# Patient Record
Sex: FEMALE | Race: WHITE
[De-identification: names, ages, dates, MRNs, and addresses within clinical notes are randomized per-mention and may not be internally consistent; named-entity substitution may affect disease eponyms.]

---

## 2017-12-07 ENCOUNTER — HOSPITAL ENCOUNTER (EMERGENCY)
Dept: HOSPITAL 92 - ERS | Age: 62
Discharge: HOME | End: 2017-12-07
Payer: MEDICARE

## 2017-12-07 DIAGNOSIS — E78.2: ICD-10-CM

## 2017-12-07 DIAGNOSIS — E11.9: ICD-10-CM

## 2017-12-07 DIAGNOSIS — J44.0: ICD-10-CM

## 2017-12-07 DIAGNOSIS — Z79.01: ICD-10-CM

## 2017-12-07 DIAGNOSIS — I10: ICD-10-CM

## 2017-12-07 DIAGNOSIS — J20.9: ICD-10-CM

## 2017-12-07 DIAGNOSIS — Z79.899: ICD-10-CM

## 2017-12-07 DIAGNOSIS — Z86.73: ICD-10-CM

## 2017-12-07 DIAGNOSIS — D50.9: ICD-10-CM

## 2017-12-07 DIAGNOSIS — J44.1: Primary | ICD-10-CM

## 2017-12-07 DIAGNOSIS — Z79.84: ICD-10-CM

## 2017-12-07 LAB
ALP SERPL-CCNC: 121 U/L (ref 40–150)
ALT SERPL W P-5'-P-CCNC: 40 U/L (ref 8–55)
ANION GAP SERPL CALC-SCNC: 17 MMOL/L (ref 10–20)
APTT PPP: 59.3 SEC (ref 22.9–36.1)
AST SERPL-CCNC: 43 U/L (ref 5–34)
BASOPHILS # BLD AUTO: 0.1 THOU/UL (ref 0–0.2)
BASOPHILS NFR BLD AUTO: 0.5 % (ref 0–1)
BILIRUB SERPL-MCNC: 0.5 MG/DL (ref 0.2–1.2)
BUN SERPL-MCNC: 15 MG/DL (ref 9.8–20.1)
CALCIUM SERPL-MCNC: 9.3 MG/DL (ref 7.8–10.44)
CHLORIDE SERPL-SCNC: 100 MMOL/L (ref 98–107)
CK SERPL-CCNC: 53 U/L (ref 29–168)
CO2 SERPL-SCNC: 23 MMOL/L (ref 22–29)
CREAT CL PREDICTED SERPL C-G-VRATE: 0 ML/MIN (ref 70–130)
EOSINOPHIL # BLD AUTO: 0 THOU/UL (ref 0–0.7)
EOSINOPHIL NFR BLD AUTO: 0.3 % (ref 0–10)
GLOBULIN SER CALC-MCNC: 3.7 G/DL (ref 2.4–3.5)
HCT VFR BLD CALC: 41 % (ref 36–47)
HYALINE CASTS #/AREA URNS LPF: (no result) LPF
LYMPHOCYTES # BLD: 2.6 THOU/UL (ref 1.2–3.4)
LYMPHOCYTES NFR BLD AUTO: 18.7 % (ref 21–51)
MONOCYTES # BLD AUTO: 1.2 THOU/UL (ref 0.11–0.59)
MONOCYTES NFR BLD AUTO: 8.4 % (ref 0–10)
NEUTROPHILS # BLD AUTO: 9.9 THOU/UL (ref 1.4–6.5)
PROT UR STRIP.AUTO-MCNC: 300 MG/DL
PROTHROMBIN TIME: 27.5 SEC (ref 12–14.7)
RBC # BLD AUTO: 4.09 MILL/UL (ref 4.2–5.4)
TROPONIN I SERPL DL<=0.01 NG/ML-MCNC: (no result) NG/ML (ref ?–0.03)
WBC # BLD AUTO: 13.7 THOU/UL (ref 4.8–10.8)
WBC UR QL AUTO: (no result) HPF (ref 0–3)

## 2017-12-07 PROCEDURE — 82550 ASSAY OF CK (CPK): CPT

## 2017-12-07 PROCEDURE — 82553 CREATINE MB FRACTION: CPT

## 2017-12-07 PROCEDURE — 85610 PROTHROMBIN TIME: CPT

## 2017-12-07 PROCEDURE — 81015 MICROSCOPIC EXAM OF URINE: CPT

## 2017-12-07 PROCEDURE — 96374 THER/PROPH/DIAG INJ IV PUSH: CPT

## 2017-12-07 PROCEDURE — 83880 ASSAY OF NATRIURETIC PEPTIDE: CPT

## 2017-12-07 PROCEDURE — 80053 COMPREHEN METABOLIC PANEL: CPT

## 2017-12-07 PROCEDURE — 51701 INSERT BLADDER CATHETER: CPT

## 2017-12-07 PROCEDURE — 71010: CPT

## 2017-12-07 PROCEDURE — 84484 ASSAY OF TROPONIN QUANT: CPT

## 2017-12-07 PROCEDURE — 85379 FIBRIN DEGRADATION QUANT: CPT

## 2017-12-07 PROCEDURE — 85025 COMPLETE CBC W/AUTO DIFF WBC: CPT

## 2017-12-07 PROCEDURE — A4353 INTERMITTENT URINARY CATH: HCPCS

## 2017-12-07 PROCEDURE — 93005 ELECTROCARDIOGRAM TRACING: CPT

## 2017-12-07 PROCEDURE — 36415 COLL VENOUS BLD VENIPUNCTURE: CPT

## 2017-12-07 PROCEDURE — 85730 THROMBOPLASTIN TIME PARTIAL: CPT

## 2017-12-07 PROCEDURE — 70450 CT HEAD/BRAIN W/O DYE: CPT

## 2017-12-07 PROCEDURE — 81003 URINALYSIS AUTO W/O SCOPE: CPT

## 2017-12-07 NOTE — RAD
PORTABLE UPRIGHT FRONTAL CHEST RADIOGRAPH:

 

DATE: 12/7/17.

 

COMPARISON: 

3/2/15.

 

HISTORY: 

Headache and increasing shortness of breath.

 

FINDINGS: 

There is no pneumothorax or pleural fluid and no focal consolidation or alveolar edema.  Heart and me
diastinal contours appear within normal limits.

 

IMPRESSION: 

No acute findings.

 

POS: [facility]

## 2017-12-07 NOTE — CT
HEAD CT WITHOUT CONTRAST:

 

Date: 12-7-17 

 

Comparison: 3-2-15

 

History: Headache and shortness of breath. 

 

Technique: Serial axial CT imaging at 5 mm intervals from vertex through skull base without contrast.
 

 

FINDINGS: 

The imaged paranasal sinuses and mastoid air cells are well aerated. There is atherosclerotic calcifi
cation of the cavernous carotid arteries. There is no displaced calvarial fracture. 

 

There is extensive encephalomalacia involving the left cerebral hemisphere, evidence of prior extensi
ve left MCA infarction. Hypodensity noted within the posterior inferior left cerebellar hemisphere, c
onsistent with areas of prior ischemia/insult as well. No intracranial hemorrhage, midline shift, or 
mass effect. 

 

IMPRESSION: 

Stable head CT as detailed above. No intracranial hemorrhage or displaced calvarial fracture. 

 

POS: [facility]

## 2017-12-09 NOTE — EKG
Test Reason : 

Blood Pressure : ***/*** mmHG

Vent. Rate : 110 BPM     Atrial Rate : 110 BPM

   P-R Int : 176 ms          QRS Dur : 100 ms

    QT Int : 344 ms       P-R-T Axes : 066 067 034 degrees

   QTc Int : 465 ms

 

Sinus tachycardia with occasional Premature ventricular complexes

Otherwise normal ECG

 

Confirmed by JASMEET FRENCH D.O. ([id number]),  PETROS VELAZQUEZ ([id number]) on 12/9/2017 3:39:56 PM

 

Referred By:             Confirmed By:JASMEET FRENCH D.O.

## 2020-05-17 ENCOUNTER — HOSPITAL ENCOUNTER (INPATIENT)
Dept: HOSPITAL 92 - CCU | Age: 65
LOS: 10 days | Discharge: HOME | DRG: 871 | End: 2020-05-27
Attending: INTERNAL MEDICINE | Admitting: FAMILY MEDICINE
Payer: MEDICARE

## 2020-05-17 VITALS — BODY MASS INDEX: 38.9 KG/M2

## 2020-05-17 DIAGNOSIS — Z86.718: ICD-10-CM

## 2020-05-17 DIAGNOSIS — Z20.828: ICD-10-CM

## 2020-05-17 DIAGNOSIS — E66.01: ICD-10-CM

## 2020-05-17 DIAGNOSIS — Z79.01: ICD-10-CM

## 2020-05-17 DIAGNOSIS — F32.9: ICD-10-CM

## 2020-05-17 DIAGNOSIS — E78.5: ICD-10-CM

## 2020-05-17 DIAGNOSIS — I89.0: ICD-10-CM

## 2020-05-17 DIAGNOSIS — A41.9: Primary | ICD-10-CM

## 2020-05-17 DIAGNOSIS — J44.1: ICD-10-CM

## 2020-05-17 DIAGNOSIS — K92.2: ICD-10-CM

## 2020-05-17 DIAGNOSIS — J69.0: ICD-10-CM

## 2020-05-17 DIAGNOSIS — Z87.891: ICD-10-CM

## 2020-05-17 DIAGNOSIS — R13.10: ICD-10-CM

## 2020-05-17 DIAGNOSIS — J45.30: ICD-10-CM

## 2020-05-17 DIAGNOSIS — G81.94: ICD-10-CM

## 2020-05-17 DIAGNOSIS — I12.9: ICD-10-CM

## 2020-05-17 DIAGNOSIS — E11.22: ICD-10-CM

## 2020-05-17 DIAGNOSIS — J90: ICD-10-CM

## 2020-05-17 DIAGNOSIS — D62: ICD-10-CM

## 2020-05-17 DIAGNOSIS — Z86.711: ICD-10-CM

## 2020-05-17 DIAGNOSIS — K22.10: ICD-10-CM

## 2020-05-17 DIAGNOSIS — J96.21: ICD-10-CM

## 2020-05-17 DIAGNOSIS — F03.90: ICD-10-CM

## 2020-05-17 DIAGNOSIS — R53.81: ICD-10-CM

## 2020-05-17 DIAGNOSIS — I63.9: ICD-10-CM

## 2020-05-17 DIAGNOSIS — E87.0: ICD-10-CM

## 2020-05-17 DIAGNOSIS — N18.3: ICD-10-CM

## 2020-05-17 DIAGNOSIS — I69.328: ICD-10-CM

## 2020-05-17 DIAGNOSIS — J96.22: ICD-10-CM

## 2020-05-17 DIAGNOSIS — Z79.4: ICD-10-CM

## 2020-05-17 DIAGNOSIS — R65.20: ICD-10-CM

## 2020-05-17 DIAGNOSIS — I69.319: ICD-10-CM

## 2020-05-17 DIAGNOSIS — L03.115: ICD-10-CM

## 2020-05-17 LAB
ALBUMIN SERPL BCG-MCNC: 2.5 G/DL (ref 3.4–4.8)
ALP SERPL-CCNC: 121 U/L (ref 40–110)
ALT SERPL W P-5'-P-CCNC: 24 U/L (ref 8–55)
ANALYZER IN CARDIO: (no result)
ANALYZER IN CARDIO: (no result)
ANION GAP SERPL CALC-SCNC: 14 MMOL/L (ref 10–20)
AST SERPL-CCNC: 29 U/L (ref 5–34)
BASE EXCESS STD BLDA CALC-SCNC: 2.8 MEQ/L
BASE EXCESS STD BLDA CALC-SCNC: 3.4 MEQ/L
BASO STIPL BLD QL SMEAR: (no result) (100X)
BILIRUB SERPL-MCNC: 0.2 MG/DL (ref 0.2–1.2)
BUN SERPL-MCNC: 48 MG/DL (ref 9.8–20.1)
CA-I BLDA-SCNC: 1.28 MMOL/L (ref 1.12–1.3)
CA-I BLDA-SCNC: 1.29 MMOL/L (ref 1.12–1.3)
CALCIUM SERPL-MCNC: 9.2 MG/DL (ref 7.8–10.44)
CHLORIDE SERPL-SCNC: 117 MMOL/L (ref 98–107)
CO2 SERPL-SCNC: 23 MMOL/L (ref 23–31)
CREAT CL PREDICTED SERPL C-G-VRATE: 89 ML/MIN (ref 70–130)
GLOBULIN SER CALC-MCNC: 3.1 G/DL (ref 2.4–3.5)
GLUCOSE SERPL-MCNC: 359 MG/DL (ref 80–115)
HCO3 BLDA-SCNC: 29.9 MEQ/L (ref 22–28)
HCO3 BLDA-SCNC: 30.6 MEQ/L (ref 22–28)
HCT VFR BLDA CALC: 32 % (ref 36–47)
HCT VFR BLDA CALC: 35 % (ref 36–47)
HGB BLD-MCNC: 10.9 G/DL (ref 12–16)
HGB BLDA-MCNC: 11 G/DL (ref 12–16)
HGB BLDA-MCNC: 12 G/DL (ref 12–16)
MCH RBC QN AUTO: 32.6 PG (ref 27–31)
MCV RBC AUTO: 100 FL (ref 78–98)
MDIFF COMPLETE?: YES
O2 A-A PPRESDIFF RESPIRATORY: 592.12 MM[HG] (ref 0–20)
PCO2 BLDA: 54.7 MMHG (ref 35–45)
PCO2 BLDA: 63.4 MMHG (ref 35–45)
PH BLDA: 7.3 [PH] (ref 7.35–7.45)
PH BLDA: 7.36 [PH] (ref 7.35–7.45)
PLATELET # BLD AUTO: 226 THOU/UL (ref 130–400)
PO2 BLDA: 46 MMHG (ref 80–?)
PO2 BLDA: 52.5 MMHG (ref 80–?)
POLYCHROMASIA BLD QL SMEAR: (no result) (100X)
POTASSIUM BLD-SCNC: 3.54 MMOL/L (ref 3.7–5.3)
POTASSIUM BLD-SCNC: 3.71 MMOL/L (ref 3.7–5.3)
POTASSIUM SERPL-SCNC: 4.7 MMOL/L (ref 3.5–5.1)
RBC # BLD AUTO: 3.34 MILL/UL (ref 4.2–5.4)
SODIUM SERPL-SCNC: 149 MMOL/L (ref 136–145)
SPECIMEN DRAWN FROM PATIENT: (no result)
SPECIMEN DRAWN FROM PATIENT: (no result)
WBC # BLD AUTO: 15.7 THOU/UL (ref 4.8–10.8)

## 2020-05-17 PROCEDURE — 80202 ASSAY OF VANCOMYCIN: CPT

## 2020-05-17 PROCEDURE — 85025 COMPLETE CBC W/AUTO DIFF WBC: CPT

## 2020-05-17 PROCEDURE — 80048 BASIC METABOLIC PNL TOTAL CA: CPT

## 2020-05-17 PROCEDURE — 86901 BLOOD TYPING SEROLOGIC RH(D): CPT

## 2020-05-17 PROCEDURE — 94003 VENT MGMT INPAT SUBQ DAY: CPT

## 2020-05-17 PROCEDURE — 87070 CULTURE OTHR SPECIMN AEROBIC: CPT

## 2020-05-17 PROCEDURE — 74018 RADEX ABDOMEN 1 VIEW: CPT

## 2020-05-17 PROCEDURE — 84100 ASSAY OF PHOSPHORUS: CPT

## 2020-05-17 PROCEDURE — S0028 INJECTION, FAMOTIDINE, 20 MG: HCPCS

## 2020-05-17 PROCEDURE — 74230 X-RAY XM SWLNG FUNCJ C+: CPT

## 2020-05-17 PROCEDURE — 05H633Z INSERTION OF INFUSION DEVICE INTO LEFT SUBCLAVIAN VEIN, PERCUTANEOUS APPROACH: ICD-10-PCS

## 2020-05-17 PROCEDURE — 86900 BLOOD TYPING SEROLOGIC ABO: CPT

## 2020-05-17 PROCEDURE — 80053 COMPREHEN METABOLIC PANEL: CPT

## 2020-05-17 PROCEDURE — 36416 COLLJ CAPILLARY BLOOD SPEC: CPT

## 2020-05-17 PROCEDURE — 0W9B3ZZ DRAINAGE OF LEFT PLEURAL CAVITY, PERCUTANEOUS APPROACH: ICD-10-PCS

## 2020-05-17 PROCEDURE — 85007 BL SMEAR W/DIFF WBC COUNT: CPT

## 2020-05-17 PROCEDURE — 84443 ASSAY THYROID STIM HORMONE: CPT

## 2020-05-17 PROCEDURE — C9113 INJ PANTOPRAZOLE SODIUM, VIA: HCPCS

## 2020-05-17 PROCEDURE — 85027 COMPLETE CBC AUTOMATED: CPT

## 2020-05-17 PROCEDURE — 0BCB8ZZ EXTIRPATION OF MATTER FROM LEFT LOWER LOBE BRONCHUS, VIA NATURAL OR ARTIFICIAL OPENING ENDOSCOPIC: ICD-10-PCS

## 2020-05-17 PROCEDURE — 71250 CT THORAX DX C-: CPT

## 2020-05-17 PROCEDURE — 0DJ08ZZ INSPECTION OF UPPER INTESTINAL TRACT, VIA NATURAL OR ARTIFICIAL OPENING ENDOSCOPIC: ICD-10-PCS | Performed by: INTERNAL MEDICINE

## 2020-05-17 PROCEDURE — 5A1945Z RESPIRATORY VENTILATION, 24-96 CONSECUTIVE HOURS: ICD-10-PCS | Performed by: FAMILY MEDICINE

## 2020-05-17 PROCEDURE — 84145 PROCALCITONIN (PCT): CPT

## 2020-05-17 PROCEDURE — 86850 RBC ANTIBODY SCREEN: CPT

## 2020-05-17 PROCEDURE — 36415 COLL VENOUS BLD VENIPUNCTURE: CPT

## 2020-05-17 PROCEDURE — 87205 SMEAR GRAM STAIN: CPT

## 2020-05-17 PROCEDURE — 82805 BLOOD GASES W/O2 SATURATION: CPT

## 2020-05-17 PROCEDURE — 85610 PROTHROMBIN TIME: CPT

## 2020-05-17 PROCEDURE — 0BH17EZ INSERTION OF ENDOTRACHEAL AIRWAY INTO TRACHEA, VIA NATURAL OR ARTIFICIAL OPENING: ICD-10-PCS | Performed by: FAMILY MEDICINE

## 2020-05-17 PROCEDURE — 82274 ASSAY TEST FOR BLOOD FECAL: CPT

## 2020-05-17 PROCEDURE — 94640 AIRWAY INHALATION TREATMENT: CPT

## 2020-05-17 PROCEDURE — 71045 X-RAY EXAM CHEST 1 VIEW: CPT

## 2020-05-17 PROCEDURE — 93306 TTE W/DOPPLER COMPLETE: CPT

## 2020-05-17 PROCEDURE — 83880 ASSAY OF NATRIURETIC PEPTIDE: CPT

## 2020-05-17 PROCEDURE — 83735 ASSAY OF MAGNESIUM: CPT

## 2020-05-17 PROCEDURE — 94002 VENT MGMT INPAT INIT DAY: CPT

## 2020-05-17 RX ADMIN — VANCOMYCIN HYDROCHLORIDE SCH MLS: 1 INJECTION, SOLUTION INTRAVENOUS at 22:29

## 2020-05-17 RX ADMIN — INSULIN LISPRO PRN UNIT: 100 INJECTION, SOLUTION INTRAVENOUS; SUBCUTANEOUS at 18:36

## 2020-05-17 NOTE — PDOC.BPN
- Brief Progress Note





Patient's PCP found to be Dr. Lloyd. Contacted Dr. Hayden with hospitalist 

service. [facility] Hospitalist will assume care at 0700 on 5/18/20. Dr. Gt Daigle, FMR attending, aware and in agreement with plan.

## 2020-05-17 NOTE — PDOC.FPRHP
- History of Present Illness


Chief Complaint: Transfer from [facility] due to intubation


History of Present Illness: 





65 yo female admitted to CCU from [facility] due to intubated status. Patient was 

admitted on 5/13 due to right lower extremity cellulitis. She was placed on 

Vanc and Cefepime. Per documentation, cellulitis improved. However, today on 5/ 17 patient was found to be altered and had a CO2 of greater than 60 and not 

able to protect her airway. At that time, it was decided the patient was to be 

intubated and transferred to [facility] for CCU admission. No other history 

able to be obtained due to patient's mental status and no other physician 

contact.





- Allergies/Adverse Reactions


 Allergies











Allergy/AdvReac Type Severity Reaction Status Date / Time


 


codeine Allergy   Verified 05/14/20 17:31














- Home Medications


 











 Medication  Instructions  Recorded  Confirmed  Type


 


Amlodipine [Norvasc] 10 mg PO DAILY 03/02/15 03/02/15 History


 


Cholecalciferol (Vitamin D3) 2,000 unit PO DAILY 03/02/15 03/02/15 History





[Vitamin D3]    


 


Docusate [Colace] 100 mg PO BID 03/02/15 03/02/15 History


 


Donepezil HCl [Aricept] 10 mg PO HS 03/02/15 03/02/15 History


 


Ferrous Sulfate [Iron] 325 mg PO BID 03/02/15 03/02/15 History


 


Glimepiride [Amaryl] 2 mg PO QAM- 03/02/15 03/02/15 History


 


Insulin Glargine,Hum.Rec.Anlog 26 unit SC  03/02/15 03/02/15 History





[Lantus Solostar]    


 


Multivitamin [Multi-Vitamin Daily] 1 tablet PO DAILY 03/02/15 03/02/15 History


 


Pravastatin Sodium [Pravachol] 40 mg PO HS 03/02/15 03/02/15 History


 


Promethazine HCl [Phenergan] 25 mg IM Q6HR PRN 03/02/15 03/02/15 History


 


Warfarin Sodium [Coumadin] 6 mg PO DAILY 03/02/15 03/02/15 History


 


cloNIDine [Catapres] 0.1 mg PO Q6H PRN 03/02/15 03/02/15 History


 


cloNIDine [Catapres] 0.2 mg PO TID 03/02/15 03/02/15 History


 


metFORMIN HCl 500 mg PO BID-WM 03/02/15 03/02/15 History


 


traMADol HCl [Tramadol HCl] 50 mg PO BID 03/02/15 03/02/15 History


 


traZODone HCl 75 mg PO HS 03/02/15 03/02/15 History


 


Acetaminophen [Tylenol Regular 650 mg PO Q4H PRN #0 tab 03/03/15  Rx





Strength]    


 


Glucagon 1 mg IM PRN PRN #0 vial 03/03/15  Rx


 


HumaLOG [HumaLOG Vial] 0 units SC .MILD SLIDING SCALE PRN 03/03/15  Rx





 #0 vial   


 


Lisinopril [Zestril] 20 mg PO BID #0 tab 03/03/15  Rx














- History


PMHx: Asthma, Dementia, DM2, , HLD, Lymphedema, CVA


 


PSHx: Unable to be obtained





FHx: Unable to be obtained





Social: Lives in [place]. History of former smoking. No other history able to be 

obtained. 


 








- Review of Systems


ROS unobtainable: due to mental status





- Vital signs


BP: 155/77  HR: 102 RR: 24 Tmax: 98.0 Pox: 90% on Mech Vent  Wt: 113 kg   








- Physical Exam


Constitutional: NAD (Currently intubated and sedated)


-HEENT: 





ET in place


Heart: RRR, normal S1/S2


Lungs: no respiratory distress, good air movement


-Lungs: 





wheezing throughout


Abdomen: soft, non-tender, bowel sounds present, no masses/distention


Musculoskeletal: other (unable to be fully evaluated)


Neurological: other


-Neurological: 





intubated and sedated


Skin: other


-Skin: 





Right lower extremity swelling and redness. 


Heme/Lymphatic: no unusual bruising or bleeding, no purpura, no petechia, other


-Psychiatric: 





intubated and sedated





FMR H&P: Results





- Labs


Result Diagrams: 


 05/17/20 18:04





 05/17/20 18:04





FMR H&P: A/P





- Problem List


(1) Sepsis


Current Visit: Yes   Status: Acute   Code(s): A41.9 - SEPSIS, UNSPECIFIED 

ORGANISM   





(2) Cellulitis


Current Visit: Yes   Status: Acute   Code(s): L03.90 - CELLULITIS, UNSPECIFIED 

  





(3) History of Coumadin therapy


Current Visit: Yes   Status: Acute   Code(s): Z92.29 - PERSONAL HISTORY OF 

OTHER DRUG THERAPY   





(4) Acute respiratory failure with hypoxia


Current Visit: Yes   Status: Acute   Code(s): J96.01 - ACUTE RESPIRATORY 

FAILURE WITH HYPOXIA   





(5) Hypertension


Current Visit: Yes   Status: Acute   Code(s): I10 - ESSENTIAL (PRIMARY) 

HYPERTENSION   





(6) Diabetes mellitus


Current Visit: Yes   Status: Acute   Code(s): E11.9 - TYPE 2 DIABETES MELLITUS 

WITHOUT COMPLICATIONS   





(7) History of CVA (cerebrovascular accident)


Current Visit: Yes   Status: Acute   Code(s): Z86.73 - PRSNL HX OF TIA (TIA), 

AND CEREB INFRC W/O RESID DEFICITS   





- Plan





Sepsis 2/2 to Cellulitis


- Continued Vancomycin and Cefepime 5/13


- Cultures from outside ED no growth to date


- Consider repeat culture if no improvement occurs


- Supportive care





Acute respiratory failure with hypoxia 


- Currently on mechanical ventilation


- Will rule out DVT and consider PE evaluation as well however chronic Coumadin 

use and previous INR in records show 1.7


- Consulted Pulmonology, appreciate recs





History of Coumadin therapy  


- Unclear history


- INR at previous facility subtherapeutic


- Will restart 


- Consider therapeutic lovenox if needed





Hypertension 


- Monitor and restart medications as appropriate





DM2


- Accuchecks


- SSI 


- Restart home regimen as appropriate





Dementia


- Unknown baseline


- Will contact family for further direction





Hx of CVA


- Right sided deficits


- Occurred in 2012





PCP: DUSTIN Lloyd





Disposition: Guarded, will admit to CCU for further evaluation and management. 

Patient was seen and evaluated with Dr. Gt Daigle MD, FMR attending, who 

is in agreement with plan. 








Addendum - Attending





- Attending Attestation


Date/Time: 05/17/20 0714





I personally evaluated the patient and discussed the management with Dr. Jones.


I agree with the History, Examination, Assessment and Plan documented above 

with any addition or exceptions noted below.





Patient here after acute hypercapnic respiratory failure while admitted for 

sepsis 2/2 cellulitis. It is currently uncertain why she may have 

decompensated. She has no known history of CHF and was not receiving narcotics 

or other sedatives at her other hospital. She developed respiratory distress, 

was intubated, and transferred here for higher level of care. On exam, she is 

oxygenating well on ventilator, I believe 60% FiO2. She is morbidly obese, and 

there are markings consistent with tracking of RLE cellulitis. Patient will be 

admitted for hypercapnia, currently ventilated. Pulm on board. Check labs, CXR. 

Continue antibiotics for cellulitis, will obtain LE dopplers to R/O DVT. 

Further mgmt pending clinical course. We are otherwise unaware of her current 

metabolic and infectious status and will check labs and modify plan as needed.

## 2020-05-18 LAB
ALBUMIN SERPL BCG-MCNC: 2.4 G/DL (ref 3.4–4.8)
ALP SERPL-CCNC: 106 U/L (ref 40–110)
ALT SERPL W P-5'-P-CCNC: 21 U/L (ref 8–55)
ANALYZER IN CARDIO: (no result)
ANION GAP SERPL CALC-SCNC: 11 MMOL/L (ref 10–20)
AST SERPL-CCNC: 19 U/L (ref 5–34)
BASE EXCESS STD BLDA CALC-SCNC: 9.5 MEQ/L
BILIRUB SERPL-MCNC: 0.3 MG/DL (ref 0.2–1.2)
BUN SERPL-MCNC: 50 MG/DL (ref 9.8–20.1)
CA-I BLDA-SCNC: 1.21 MMOL/L (ref 1.12–1.3)
CALCIUM SERPL-MCNC: 8.7 MG/DL (ref 7.8–10.44)
CHLORIDE SERPL-SCNC: 113 MMOL/L (ref 98–107)
CO2 SERPL-SCNC: 32 MMOL/L (ref 23–31)
CREAT CL PREDICTED SERPL C-G-VRATE: 83 ML/MIN (ref 70–130)
GLOBULIN SER CALC-MCNC: 2.8 G/DL (ref 2.4–3.5)
GLUCOSE SERPL-MCNC: 279 MG/DL (ref 80–115)
HCO3 BLDA-SCNC: 34.2 MEQ/L (ref 22–28)
HCT VFR BLDA CALC: 31 % (ref 36–47)
HGB BLD-MCNC: 9.9 G/DL (ref 12–16)
HGB BLDA-MCNC: 10.5 G/DL (ref 12–16)
INR PPP: 2.6
INR PPP: 2.6
MAGNESIUM SERPL-MCNC: 2.2 MG/DL (ref 1.6–2.6)
MCH RBC QN AUTO: 32.3 PG (ref 27–31)
MCV RBC AUTO: 101 FL (ref 78–98)
MDIFF COMPLETE?: YES
PCO2 BLDA: 47.4 MMHG (ref 35–45)
PH BLDA: 7.48 [PH] (ref 7.35–7.45)
PLATELET # BLD AUTO: 300 THOU/UL (ref 130–400)
PO2 BLDA: 54.3 MMHG (ref 80–?)
POTASSIUM BLD-SCNC: 2.99 MMOL/L (ref 3.7–5.3)
POTASSIUM SERPL-SCNC: 3.1 MMOL/L (ref 3.5–5.1)
PROTHROMBIN TIME: 27.3 SEC (ref 12–14.7)
PROTHROMBIN TIME: 27.9 SEC (ref 12–14.7)
RBC # BLD AUTO: 3.07 MILL/UL (ref 4.2–5.4)
SODIUM SERPL-SCNC: 153 MMOL/L (ref 136–145)
SPECIMEN DRAWN FROM PATIENT: (no result)
WBC # BLD AUTO: 15.6 THOU/UL (ref 4.8–10.8)

## 2020-05-18 RX ADMIN — INSULIN LISPRO PRN UNIT: 100 INJECTION, SOLUTION INTRAVENOUS; SUBCUTANEOUS at 21:24

## 2020-05-18 RX ADMIN — FAMOTIDINE SCH MG: 10 INJECTION, SOLUTION INTRAVENOUS at 19:51

## 2020-05-18 RX ADMIN — VANCOMYCIN HYDROCHLORIDE SCH MLS: 1 INJECTION, SOLUTION INTRAVENOUS at 08:27

## 2020-05-18 RX ADMIN — INSULIN LISPRO PRN UNIT: 100 INJECTION, SOLUTION INTRAVENOUS; SUBCUTANEOUS at 23:57

## 2020-05-18 RX ADMIN — POTASSIUM CHLORIDE PRN MLS: 29.8 INJECTION, SOLUTION INTRAVENOUS at 07:38

## 2020-05-18 RX ADMIN — FENTANYL CITRATE SCH MLS: 50 INJECTION, SOLUTION INTRAMUSCULAR; INTRAVENOUS at 05:37

## 2020-05-18 RX ADMIN — FAMOTIDINE SCH MG: 10 INJECTION, SOLUTION INTRAVENOUS at 09:27

## 2020-05-18 RX ADMIN — INSULIN HUMAN SCH UNIT: 100 INJECTION, SUSPENSION SUBCUTANEOUS at 21:17

## 2020-05-18 RX ADMIN — INSULIN LISPRO PRN UNIT: 100 INJECTION, SOLUTION INTRAVENOUS; SUBCUTANEOUS at 06:00

## 2020-05-18 RX ADMIN — INSULIN LISPRO PRN UNIT: 100 INJECTION, SOLUTION INTRAVENOUS; SUBCUTANEOUS at 18:42

## 2020-05-18 RX ADMIN — INSULIN LISPRO PRN UNIT: 100 INJECTION, SOLUTION INTRAVENOUS; SUBCUTANEOUS at 11:17

## 2020-05-18 RX ADMIN — VANCOMYCIN HYDROCHLORIDE SCH MLS: 1 INJECTION, SOLUTION INTRAVENOUS at 19:52

## 2020-05-18 RX ADMIN — FENTANYL CITRATE SCH MLS: 50 INJECTION, SOLUTION INTRAMUSCULAR; INTRAVENOUS at 20:59

## 2020-05-18 RX ADMIN — INSULIN LISPRO PRN UNIT: 100 INJECTION, SOLUTION INTRAVENOUS; SUBCUTANEOUS at 02:26

## 2020-05-18 RX ADMIN — Medication SCH ML: at 19:52

## 2020-05-18 NOTE — CON
DATE OF CONSULTATION:  05/17/2020



CHIEF COMPLAINT:  Respiratory failure.



HISTORY OF PRESENT ILLNESS:  Ms. Núñez is a 64-year-old female who is a long-
term

resident of a local nursing home.  She had a stroke in 2008 with residual 
deficit

affecting both cognition, speech and ambulation.  Her family states that she 
has not

been in contact with them much in the time since COVID caused visitation

restrictions.  They were not aware of significant change in her status until 
several

days ago when the nursing home called to report that she had developed a 
cellulitis

of the right leg.  She was transferred to [facility] and

admitted with a diagnosis of cellulitis.  Her COVID test there was reported to 
be

negative.  She had progression of symptoms despite antibiotics and had 
worsening of

her respiratory distress.  She was given BiPAP support during the night and

high-flow nasal oxygen during the day.  This continued for several days without

significant recovery.  Today, however, the patient was noted to be more 
somnolent

and having increased respiratory distress.  A decision was made at that time to

intubate her and she was then transferred to [facility] for admission 
and

additional care.  I do not have access to their EMR



PAST MEDICAL HISTORY:  Remarkable for CVA in 2008 with residual deficits.  She 
has a

long smoking history, having quit at the time of her stroke.  She has a history 
of

chronic circulation issues of the right leg and has had previous bouts of

cellulitis.  She has a history of diabetes.  She has been on Coumadin

anticoagulation since the time of her stroke reportedly for stroke prophylaxis 
and

without known history of atrial fibrillation. 



REVIEW OF SYSTEMS:  Unobtainable from the patient.  Symptoms preceding her 
admission

are unclear and unavailable to me at this time.  Remote symptoms and findings 
have

been reviewed with her family by phone. 



FAMILY HISTORY:  Noncontributory.



PHYSICAL EXAMINATION:

GENERAL:  The patient is an obese female, appearing older than her stated age.  
She

is orally intubated. 

VITAL SIGNS:  Her blood pressure is 138/66, heart rate 92, saturation 91%, and

respiratory rate 29.  She is receiving ventilatory care. 

HEENT:  No adenopathy.  She has no JVD.  Cranial nerves cannot be tested. 

LUNGS:  Rhonchi with some decreased breath sounds in the left chest. 

HEART:  Regular rate and rhythm.  She has a systolic murmur along the left heart

border. 

ABDOMEN:  Soft.  There is no organomegaly.  She is obese. 

EXTREMITIES:  Demonstrate cellulitis of the right leg up to the mid thigh.  
This is

marked and has not exceeded the lyle which has previously been placed.  It is

erythematous, but not exceedingly warm.  She has 1+ edema on the left. 

NEUROLOGIC:  She is intubated and sedated and does not respond to verbal 
stimuli at

this time. 



LABORATORY DATA:  White count 15,700, hemoglobin is 10.9.  Differential 
includes 72

neutrophils and 7 bands.  Blood gas includes pH of 7.36, CO2 of 54, PO2 of 52, 
and

bicarbonate of 30.  This was obtained with a rate of 14 on 100% tidal volume of 
500.

 Electrolytes include sodium 149, potassium 4.7, chloride 117, anion gap is 14, 
CO2

is 23, BUN 48, creatinine 1.1, and glucose is 359.  Albumin has reduced to 2.5.

Liver tests are normal. 



Chest x-ray shows consolidation and effusion in the left base, which was not 
seen on

the x-ray of 3 days previously. 



IMPRESSION:  

1. Respiratory failure.  The patient has a history of chronic obstructive 
pulmonary

disease, but has not been in this degree of air hunger until now.  She was 
receiving

BiPAP during the night and high-flow nasal oxygen during the day while at 
[facility] prior to intubation and transfer.  I suspect that her

worsening may in fact be related to the development of a pneumonia, either

hematogenously spread or related to aspiration. 

2. Cellulitis, on empiric broad-spectrum antibiotics. 

3. History of previous stroke with residual deficit. 

4. Diabetes. 

5. Past tobacco abuse.



PLAN:  The patient is admitted to the intensive care unit and is receiving

ventilatory support.  She has a markedly elevated alveolar to arterial oxygen

gradient.  She has been on anticoagulation therapy and a DVT/PE is felt to be 
less

likely.  She does have consolidation in the left base and what appears to be an

effusion.  I have obtained consent from the patient's family to do a sonogram 
and

thoracentesis if indicated.  We will otherwise continue supportive therapy. 



The family wishes her to be full code status at least for the foreseeable 
future. 



Thank you for this consultation.  Pulmonary Service will continue to follow and

offer additional assistance. 







Job ID:  [id number]



MTDD

## 2020-05-18 NOTE — PRG
DATE OF SERVICE:  05/17/2020



I was called by the nurses following a bronchoscopy that the patient's peak airway

pressures have increased from around upper 20s to as high as 50.  Stat chest x-ray

was requested.  I returned to the hospital and reviewed the x-ray.  There was now

good aeration and expansion of the left lung with apparent increased consolidative

markings in the right lung base.  There is no evidence of pneumothorax as I thought

might be present having attempted a left basilar thoracentesis without success.  We

will continue to monitor.  Her airflow dynamics have been altered, and now PEEP

pressures are running about 28 to 34.  She is still on 100% oxygen and saturations

are approximately 92.  We will continue to observe.  Hopefully, the right increased

markings are related to bronchoscopy and washings. 



Duplex sonogram has also just been completed of the right leg.  While not ideal,

there is evidence of augmentation at multiple levels without obvious DVT. 







Job ID:  [id number]

## 2020-05-18 NOTE — RAD
Chest one view



HISTORY: Dyspnea. Intubated. Follow-up.



COMPARISON: 5/17/2020.



FINDINGS: Cardiac silhouette is magnified by projection and partially obscured by bilateral pleural f
luid and patchy bibasilar infiltrate.



Mediastinum is midline with aortic calcification. Lines and tubes appear unchanged in position.



Pulmonary vasculature is engorged.



Calcified granulomata are consistent with healed granulomatous disease.



No evidence of pneumothorax.



  



IMPRESSION :

Pulmonary vascular congestion, pleural fluid, and other findings are stable.



Reported By: MAVIS Arshad 

Electronically Signed:  5/18/2020 7:58 AM

## 2020-05-18 NOTE — PRG
DATE OF SERVICE:  05/17/2020



X-ray obtained following central line placement and attempted thoracentesis show

complete opacification of the left hemithorax.  There is volume loss on the left

with associated hyperinflation.  We are noting increasing difficulty maintaining

oxygen saturation despite ventilation with 100%.  On exam, the patient has absent

breath sounds and no chest excursion on the left with ventilation. 



It is my expectation that the rapid changes suggest a need for bronchoscopy to clear

probable mucus impaction.  I have called the patient's daughter and reviewed with

her the radiographic findings as well as treatments provided to this point.  She

agrees.  She also understands that there may well need to be repeat bronchoscopy

until she is totally clear. 







Job ID:  [id number]

## 2020-05-18 NOTE — OP
DATE OF PROCEDURE:  05/17/2020



PROCEDURES PERFORMED:  Thoracentesis and left subclavian central line.



DESCRIPTION OF PROCEDURE:  Consent was obtained from the patient's family.  The

thoracentesis was attempted first.  Due to the size and intubated status, the

procedure was done with the patient in a supine position via the posterior axillary

approach on the left.  Sonogram showed fluid.  Local anesthesia was then obtained

and thoracentesis catheter inserted.  Unfortunately, fluid was not obtained on two

passes and the procedure was subsequently terminated. 



The left upper chest was then prepped and draped in the usual fashion in

anticipation of central line placement.  Topical lidocaine anesthesia was used

following skin prep and drape.  The subclavian vein was cannulated, wire inserted,

and following dilator, the catheter was inserted without complication.  All ports

were aspirated and good blood return and flow was noted.  The catheter was secured

in place.  A dressing was applied.  X-ray is obtained and is pending at this time. 







Job ID:  [id number]

## 2020-05-18 NOTE — RAD
SINGLE VIEW CHEST:

 

Date:  05/17/2020

Time:  2105 hours

 

HISTORY:  

Central line placement. 

 

FINDINGS:

Single view of the chest shows a normal sized cardiomediastinal silhouette. The lines and tubes are u
nchanged in position. There is significant improvement in aeration of the left lung compared to the p
rior radiograph. There appears to be a moderate right pleural effusion. A small left pleural effusion
 is seen. 

 

IMPRESSION: 

Bilateral pleural effusions with significant improved aeration of the left lung. 

 

 

POS: [facility]

## 2020-05-18 NOTE — OP
DATE OF PROCEDURE:  05/17/2020



PREOPERATIVE DIAGNOSIS:  Opacification of the left hemithorax consistent with mucus

plugging. 



POSTOPERATIVE DIAGNOSIS:  Opacification of the left hemithorax consistent with mucus

plugging. 



DESCRIPTION OF PROCEDURE:  Informed consent was obtained from the patient's

daughter.  Procedure performed in the ICU under continuous hemodynamic monitoring.

Sedation was provided by propofol, which was already running for the patient

ventilator sedation and no dose adjustment was made during the procedure. 



A bronchoscope was inserted through the existing endotracheal tube.  Tip of the tube

is in good position proximal to the hali.  The main hali is unremarkable.  Right

upper lung, right middle lung, and right lower lung showed no obstruction, but are

diffusely edematous and erythematous.  Very tenacious mucus plugs are present in the

left bronchus.  These were copiously irrigated with Mucomyst, and were so thick that

they were removed in block through the tube externally.  The bronchoscope was then

reinserted and additional Mucomyst lavage was performed.  There was no evidence of

endobronchial obstruction.  Specimens are obtained for culture.  The patient

tolerated the procedure well with good blood pressure and pulse oximetry recording

throughout.  At the termination of the procedure, she is returned to previous

ventilator settings.  We plan repeat x-ray tomorrow and additional bronchoscopy may

be required. 







Job ID:  [id number]

## 2020-05-18 NOTE — RAD
SINGLE VIEW OF THE CHEST:

 

COMPARISON: 

5/17/2020.

 

HISTORY: 

Central line placement.

 

FINDINGS: 

A single view of the chest shows complete opacity of the left thorax.  The endotracheal tube and NG t
ube are unchanged in position.  There is a left subclavian central venous catheter with its tip in th
e superior vena cava.  No pneumothorax is appreciated.  There appears to be a small right pleural eff
usion.

 

IMPRESSION: 

1.  Status post central line placement without evidence of complication.

 

2.  Complete opacity in the left thorax.

 

POS: [facility]

## 2020-05-18 NOTE — PDOC.BPN
- Brief Progress Note





Talked with Dr. Bar this morning to confirm transfer of care. Gave him 

overnight updates. He was aware of tfx. Answered all questioned.

## 2020-05-18 NOTE — PRG
DATE OF SERVICE:  05/18/2020



SUBJECTIVE:  Jocelin Núñez is a 64-year-old female, who is intubated for progressive

respiratory failure.  She now is intubated in the vent, is sedated. 



She had difficulty maintaining saturation. 



She was in a nursing home, has had residual left-sided CVA from a previous stroke. 



She has had cellulitis at the [facility] and was transferred over here.

COVID test was negative. 



She was placed on BiPAP without much improvement.  She became progressively worse.

She is intubated. 



Her daughter is at the bedside.  She tells me that she sees a local doctor by the

name of Dr. Lloyd and has no other doctors on the list. 



Daughter states she is a full code, former smoker, severely deconditioned.



PHYSICAL EXAMINATION:

GENERAL:  Sedated, vented. 

VITAL SIGNS:  Pulse 94, blood pressure 187/87, saturations are 92 to 96,

respirations 20. 

CHEST:  Decreased breath sounds.  No wheezing. 

CARDIAC:  Normal S1 and S2.  No gallops. 

ABDOMEN:  No mass.



LABORATORY DATA:  White count 15,000, hemoglobin and hematocrit are 9 and 31,

platelet count 300.  PO2 is 54, pCO2 __________ rate of 14, on 70%, pressure support

10, PEEP of 8.  Sodium 153, BUN and creatinine are 50 and 1.8, glucose 254. 



ASSESSMENT:  Acute on chronic respiratory failure, bilateral pleural effusion,

severe hypoxemia, status post atelectasis on bronchoscopy.  Status post

thoracentesis. 



PLAN:  I have added steroids.  Continue aggressive neb treatments.  Continue

broad-spectrum antibiotics.  She is not weanable at this stage.  It is probably

__________ from what I can understand. 



One-half hour of critical time.







Job ID:  [id number]

## 2020-05-18 NOTE — ULT
RIGHT LOWER EXTREMITY VENOUS ULTRASOUND:

 

Date:  05/17/2020

 

COMPARISON:  None. 

 

HISTORY:  

Right lower extremity pain and swelling. 

 

TECHNIQUE:  

Multiplanar Gray scale and color Doppler images were obtained in a right lower extremity venous ultra
sound. Spectral analysis of the Doppler waveforms were performed. 

 

FINDINGS:

The right common femoral vein, profunda femoral vein, superficial femoral vein, and popliteal vein ar
e normal in appearance without visible thrombus. These vessels demonstrate normal compression, flow, 
and augmentation. The posterior tibial vein and greater saphenous vein are also patent.  

 

IMPRESSION: 

No evidence of deep venous thrombosis. 

 

POS: [facility]

## 2020-05-19 LAB
ANALYZER IN CARDIO: (no result)
ANION GAP SERPL CALC-SCNC: 14 MMOL/L (ref 10–20)
BASE EXCESS STD BLDA CALC-SCNC: 7.5 MEQ/L
BUN SERPL-MCNC: 46 MG/DL (ref 9.8–20.1)
CA-I BLDA-SCNC: 1.14 MMOL/L (ref 1.12–1.3)
CALCIUM SERPL-MCNC: 8.5 MG/DL (ref 7.8–10.44)
CHLORIDE SERPL-SCNC: 110 MMOL/L (ref 98–107)
CO2 SERPL-SCNC: 32 MMOL/L (ref 23–31)
CREAT CL PREDICTED SERPL C-G-VRATE: 83 ML/MIN (ref 70–130)
GLUCOSE SERPL-MCNC: 269 MG/DL (ref 80–115)
HCO3 BLDA-SCNC: 34.1 MEQ/L (ref 22–28)
HCT VFR BLDA CALC: 31 % (ref 36–47)
HGB BLD-MCNC: 10 G/DL (ref 12–16)
HGB BLDA-MCNC: 10.4 G/DL (ref 12–16)
INR PPP: 1.7
MCH RBC QN AUTO: 31.9 PG (ref 27–31)
MCV RBC AUTO: 101 FL (ref 78–98)
MDIFF COMPLETE?: YES
PCO2 BLDA: 59.8 MMHG (ref 35–45)
PH BLDA: 7.37 [PH] (ref 7.35–7.45)
PLATELET # BLD AUTO: 319 THOU/UL (ref 130–400)
PO2 BLDA: 60.3 MMHG (ref 80–?)
POTASSIUM BLD-SCNC: 4.22 MMOL/L (ref 3.7–5.3)
POTASSIUM SERPL-SCNC: 4 MMOL/L (ref 3.5–5.1)
PROTHROMBIN TIME: 20.3 SEC (ref 12–14.7)
RBC # BLD AUTO: 3.12 MILL/UL (ref 4.2–5.4)
SODIUM SERPL-SCNC: 152 MMOL/L (ref 136–145)
SPECIMEN DRAWN FROM PATIENT: (no result)
VANCOMYCIN TROUGH SERPL-MCNC: 38.5 UG/ML
WBC # BLD AUTO: 21.3 THOU/UL (ref 4.8–10.8)

## 2020-05-19 RX ADMIN — Medication SCH ML: at 09:30

## 2020-05-19 RX ADMIN — FAMOTIDINE SCH MG: 10 INJECTION, SOLUTION INTRAVENOUS at 21:07

## 2020-05-19 RX ADMIN — FENTANYL CITRATE SCH MLS: 50 INJECTION, SOLUTION INTRAMUSCULAR; INTRAVENOUS at 13:35

## 2020-05-19 RX ADMIN — VANCOMYCIN HYDROCHLORIDE SCH: 1 INJECTION, SOLUTION INTRAVENOUS at 09:07

## 2020-05-19 RX ADMIN — Medication SCH ML: at 21:06

## 2020-05-19 RX ADMIN — INSULIN LISPRO PRN UNIT: 100 INJECTION, SOLUTION INTRAVENOUS; SUBCUTANEOUS at 23:41

## 2020-05-19 RX ADMIN — INSULIN HUMAN SCH UNIT: 100 INJECTION, SUSPENSION SUBCUTANEOUS at 10:38

## 2020-05-19 RX ADMIN — FAMOTIDINE SCH MG: 10 INJECTION, SOLUTION INTRAVENOUS at 09:29

## 2020-05-19 RX ADMIN — INSULIN LISPRO PRN UNIT: 100 INJECTION, SOLUTION INTRAVENOUS; SUBCUTANEOUS at 14:02

## 2020-05-19 RX ADMIN — INSULIN LISPRO PRN UNIT: 100 INJECTION, SOLUTION INTRAVENOUS; SUBCUTANEOUS at 03:55

## 2020-05-19 RX ADMIN — INSULIN LISPRO PRN UNIT: 100 INJECTION, SOLUTION INTRAVENOUS; SUBCUTANEOUS at 16:16

## 2020-05-19 NOTE — RAD
Chest one view



HISTORY: Dyspnea. Intubated. Follow-up.



COMPARISON: 5/18/2020.



FINDINGS: Cardiac silhouette is magnified by projection and remains partially obscured by bilateral p
leural fluid and bibasilar parenchymal opacity.



Mediastinum is midline. Lines and tubes are unchanged in position.



Pulmonary vasculature is engorged. No evidence of pneumothorax.



Cardiac monitor leads overlie the chest.



  



IMPRESSION :

Pulmonary vascular congestion, pleural fluid, and other findings are stable.



Reported By: MAVIS Arshad 

Electronically Signed:  5/19/2020 7:56 AM

## 2020-05-19 NOTE — PDOC.HOSPP
- Subjective


Encounter Date: 05/19/20


Encounter Time: 15:00


non-verbal


Subjective: 





Patient seen and examined for resp failure. On Vent. Sedated. No overnight 

events





- Objective


Vital Signs & Weight: 


 Vital Signs (12 hours)











  Temp Pulse Resp BP Pulse Ox


 


 05/19/20 16:00  99.6 F   19  


 


 05/19/20 15:03     191/92 H 


 


 05/19/20 14:47   105 H   191/92 H 


 


 05/19/20 14:46   105 H  17   97


 


 05/19/20 14:00    14  


 


 05/19/20 12:00  99.5 F   14  


 


 05/19/20 10:26   94   183/91 H 


 


 05/19/20 10:25   95  16   98


 


 05/19/20 10:00    16  


 


 05/19/20 09:29   116 H   176/76 H 


 


 05/19/20 08:00  99.9 F H   16   98


 


 05/19/20 06:51   105 H   140/62 


 


 05/19/20 06:50   106 H  14   98


 


 05/19/20 06:00    14  








 Weight











Admit Weight                   243 lb


 


Weight                         245 lb 13.047 oz











 Most Recent Monitor Data











Heart Rate from ECG            115


 


NIBP                           168/113


 


NIBP BP-Mean                   131


 


Respiration from ECG           22


 


SpO2                           99














I&O: 


 











 05/18/20 05/19/20 05/20/20





 06:59 06:59 06:59


 


Intake Total 601 3168 229


 


Output Total 3285 2130 1330


 


Balance -2684 1038 -1101











Result Diagrams: 


 05/19/20 02:40





 05/19/20 02:40


Additional Labs: 


 Accuchecks











  05/19/20 05/19/20 05/19/20





  16:12 12:14 03:57


 


POC Glucose  375 H  361 H  258 H














  05/19/20 05/18/20 05/18/20





  00:01 21:20 18:04


 


POC Glucose  300 H  354 H  309 H











Radiology Reviewed by me: Yes (CXR - reviewed)


EKG Reviewed by me: Yes (Tele SR)





Hospitalist ROS





- Review of Systems


ROS unobtainable: due to mental status





- Medication


Medications: 


Active Medications











Generic Name Dose Route Start Last Admin





  Trade Name Freq  PRN Reason Stop Dose Admin


 


Albuterol/Ipratropium  3 ml  05/18/20 10:30  05/19/20 14:46





  Duoneb  NEB   3 ml





  [id number] ANNIE   Administration





     





     





     





     


 


Amlodipine Besylate  10 mg  05/19/20 09:00  05/19/20 09:29





  Norvasc  PO   10 mg





  DAILY ANNIE   Administration





     





     





     





     


 


Clonidine  0.2 mg  05/19/20 09:00  05/19/20 15:03





  Catapres  PO   0.2 mg





  TID ANNIE   Administration





     





     





     





     


 


Docusate Sodium  100 mg  05/19/20 09:00  05/19/20 09:29





  Colace  PO   100 mg





  DAILY ANNIE   Administration





     





     





     





     


 


Enoxaparin Sodium  40 mg  05/18/20 09:00  05/18/20 08:26





  Lovenox  SC   40 mg





  0900 ANNIE   Administration





     





     





     





     


 


Enoxaparin Sodium  30 mg  05/19/20 09:00  05/19/20 09:28





  Lovenox  SC   30 mg





  0900,2100 ANNIE   Administration





     





     





     





     


 


Famotidine  20 mg  05/18/20 09:00  05/19/20 09:29





  Pepcid  SLOW IVP   20 mg





  BID ANNIE   Administration





     





     





     





     


 


Cefepime HCl 2 gm/ Sodium  100 mls @ 200 mls/hr  05/17/20 17:00  05/19/20 16:18





  Chloride  IVPB   100 mls





  0500,1700 ANNIE   Administration





     





     





     





     


 


Fentanyl Citrate 2,000 mcg/  100 mls @ 0 mls/hr  05/17/20 16:59  05/19/20 13:35





  Sodium Chloride  IV  06/16/20 16:59  100 mls





  INF ANNIE   Administration





     





     





  Protocol   





  Per Protocol   


 


Potassium Chloride 40 meq/  100 mls @ 50 mls/hr  05/17/20 17:05  05/18/20 07:38





  Device  IVPB   100 mls





  ASDIR PRN   Administration





  FOR SERUM K+ 2.5 - 3.5   





     





     





     


 


Sodium Chloride  1,000 mls @ 100 mls/hr  05/18/20 09:15  05/19/20 16:18





  1/2 Normal Saline  IV   1,000 mls





  .Q10H ANNIE   Administration





     





     





     





     


 


Dexmedetomidine HCl 400 mcg/  100 mls @ 0 mls/hr  05/19/20 09:00  05/19/20 15:25





  Sodium Chloride  IVPB   100 mls





  INF ANNIE   Administration





     





     





  Protocol   





  Per Protocol   


 


Insulin Human Lispro  0 units  05/19/20 01:04  05/19/20 16:16





  Humalog  SC   13 unit





  .AGGRESSIVE SLIDING PRN   Administration





  AGGRESSIVE SLIDING SCALE   





     





  Protocol   





     


 


Lisinopril  40 mg  05/19/20 09:00  05/19/20 09:29





  Zestril  PO   40 mg





  DAILY ANNIE   Administration





     





     





     





     


 


Lorazepam  2 mg  05/17/20 16:59  05/17/20 22:47





  Ativan  SLOW IVP  06/16/20 16:59  2 mg





  Q1H PRN   Administration





  Breakthrough agitation   





     





     





     


 


Methylprednisolone Sodium Succinate  40 mg  05/19/20 09:00  05/19/20 09:30





  Solu-Medrol  IVP   40 mg





  BID ANNIE   Administration





     





     





     





     


 


Propofol  1,000 mg  05/17/20 16:59  05/19/20 04:15





  Diprivan  IV  06/16/20 16:59  1,000 mg





  INF PRN   Administration





  TO ACHIEVE GOAL RASS   





     





  Protocol   





     


 


Sodium Chloride  10 ml  05/18/20 21:00  05/19/20 09:30





  Flush - Normal Saline  IVF   10 ml





  Q12HR ANNIE   Administration





     





     





     





     














- Exam


General Appearance: NAD


General - other findings: on Vent


Heart: RRR, no gallops, no rubs, normal peripheral pulses


Respiratory: no wheezes, no rales, normal chest expansion, rhonchi


Gastrointestinal: soft, non-tender, non-distended, normal bowel sounds


Extremities: no cyanosis, no clubbing


Extremities - other findings: RLE edema. Erythema improving


Neurological - other findings: Neuro/Psych - cannot assess due to sedation





Hosp A/P





- Plan


DVT proph w/lovenox





Severe Sepsis 2/2 to RLE Cellulitis


Acute respiratory failure with hypoxia/hypercapnia


Hypertension 


DM2


Hypernatremia


Dementia


h/o CVA


Mild persistent Asthma


Depression


Chronic Anticoagulation


Morbid Obesity BMI 40.9


CKD 3





PLAN:


Cont Vancomycin/Cefepime


Monitor Vancomycin level


On 1/2 NS - reduce rate to 75 due to uncontrolled HTN


Increase NPH to 25 units BID


On IV Solumedrol 40 BID


Increase Clonidine


Add Hydralazine


Cont Aggressive sliding scale


Cont supportive care


Cont other meds as above


Daily CXR/ABG/labs

## 2020-05-19 NOTE — PDOC.HOSPP
- Subjective


Encounter Date: 05/18/20


Encounter Time: 14:00


non-verbal


Subjective: 





Patient seen and examined for Resp failure/Sepsis. On Vent. No overnight events





- Objective


Vital Signs & Weight: 


 Vital Signs (12 hours)











  Temp Pulse Resp BP Pulse Ox


 


 05/19/20 08:00  99.9 F H    


 


 05/19/20 06:51   105 H   140/62 


 


 05/19/20 06:50   106 H  14   98


 


 05/19/20 06:00    14  


 


 05/19/20 04:00  99.8 F H   14  


 


 05/19/20 03:57   96   130/58 L 


 


 05/19/20 02:00    14  


 


 05/19/20 00:00  99.4 F   14  


 


 05/18/20 22:26   129 H   156/67 H 


 


 05/18/20 22:00    17  








 Weight











Admit Weight                   243 lb


 


Weight                         245 lb 13.047 oz











 Most Recent Monitor Data











Heart Rate from ECG            119


 


NIBP                           187/91


 


NIBP BP-Mean                   123


 


Respiration from ECG           17


 


SpO2                           97














I&O: 


 











 05/18/20 05/19/20 05/20/20





 06:59 06:59 06:59


 


Intake Total 601 3168 


 


Output Total 3285 2130 125


 


Balance -2684 1038 -125











Result Diagrams: 


 05/19/20 02:40





 05/19/20 02:40


Additional Labs: 


 Accuchecks











  05/19/20 05/19/20 05/18/20





  03:57 00:01 21:20


 


POC Glucose  258 H  300 H  354 H














  05/18/20 05/18/20





  18:04 11:20


 


POC Glucose  309 H  238 H











Radiology Reviewed by me: Yes (CXR - reviewed)


EKG Reviewed by me: Yes (Tele SR)





Hospitalist ROS





- Review of Systems


ROS unobtainable: due to mental status





- Medication


Medications: 


Active Medications











Generic Name Dose Route Start Last Admin





  Trade Name Freq  PRN Reason Stop Dose Admin


 


Albuterol/Ipratropium  3 ml  05/18/20 10:30  05/19/20 06:50





  Duoneb  NEB   3 ml





  [id number] ANNIE   Administration





     





     





     





     


 


Enoxaparin Sodium  40 mg  05/18/20 09:00  05/18/20 08:26





  Lovenox  SC   40 mg





  0900 ANNIE   Administration





     





     





     





     


 


Famotidine  20 mg  05/18/20 09:00  05/18/20 19:51





  Pepcid  SLOW IVP   20 mg





  BID ANNIE   Administration





     





     





     





     


 


Cefepime HCl 2 gm/ Sodium  100 mls @ 200 mls/hr  05/17/20 17:00  05/19/20 04:15





  Chloride  IVPB   100 mls





  0500,1700 ANNIE   Administration





     





     





     





     


 


Vancomycin HCl 1 gm/ Device  200 mls @ 200 mls/hr  05/17/20 21:00  05/18/20 19:

52





  IVPB   200 mls





  Q12HR ANNIE   Administration





     





     





     





     


 


Fentanyl Citrate 2,000 mcg/  100 mls @ 0 mls/hr  05/17/20 16:59  05/18/20 20:59





  Sodium Chloride  IV  06/16/20 16:59  100 mls





  INF ANNIE   Administration





     





     





  Protocol   





  Per Protocol   


 


Potassium Chloride 40 meq/  100 mls @ 50 mls/hr  05/17/20 17:05  05/18/20 07:38





  Device  IVPB   100 mls





  ASDIR PRN   Administration





  FOR SERUM K+ 2.5 - 3.5   





     





     





     


 


Sodium Chloride  1,000 mls @ 100 mls/hr  05/18/20 09:15  05/19/20 05:37





  1/2 Normal Saline  IV   1,000 mls





  .Q10H ANNIE   Administration





     





     





     





     


 


Insulin Human Lispro  0 units  05/19/20 01:04  05/19/20 03:55





  Humalog  SC   9 unit





  .AGGRESSIVE SLIDING PRN   Administration





  AGGRESSIVE SLIDING SCALE   





     





  Protocol   





     


 


Insulin Human NPH  15 unit  05/18/20 21:00  05/18/20 21:17





  Humulin N  SC   15 unit





  BID ANNIE   Administration





     





     





     





     


 


Lorazepam  2 mg  05/17/20 16:59  05/17/20 22:47





  Ativan  SLOW IVP  06/16/20 16:59  2 mg





  Q1H PRN   Administration





  Breakthrough agitation   





     





     





     


 


Propofol  1,000 mg  05/17/20 16:59  05/19/20 04:15





  Diprivan  IV  06/16/20 16:59  1,000 mg





  INF PRN   Administration





  TO ACHIEVE GOAL RASS   





     





  Protocol   





     


 


Sodium Chloride  10 ml  05/18/20 21:00  05/18/20 19:52





  Flush - Normal Saline  IVF   10 ml





  Q12HR ANINE   Administration





     





     





     





     














- Exam


General Appearance: NAD


General - other findings: on Vent


Heart: RRR, no gallops, no rubs, normal peripheral pulses


Respiratory: no wheezes, no rales, normal chest expansion, rhonchi


Gastrointestinal: soft, normal bowel sounds, no guarding, no rigidity


Extremities: no cyanosis, no clubbing


Extremities - other findings: RLE edema/erythema


Neurological - other findings: Neuro/Psych - cannot assess due to current 

mentation





Hosp A/P





- Plan


DVT proph w/SCDs





Severe Sepsis 2/2 to Cellulitis


Acute respiratory failure with hypoxia/hypercapnia


Hypertension 


DM2


Dementia


h/o CVA


Mild persistent Asthma


Depression


Chronic Anticoagulation


Morbid Obesity BMI 40.9


CKD 3


Hypernatremia





PLAN:


Cont Vancomycin and Cefepime


Monitor Vancomycin level


Hold Lovenox since INR therapeutic


Restart Warfarin if ok with Critical care


Daily CXR/ABG/labs


Cont Sliding scale


Change acuchecks to Q4h


Add NPH


Cont supportive care


Cont other meds as above


Restart home meds once verified

## 2020-05-19 NOTE — PRG
DATE OF SERVICE:  



SUBJECTIVE:  Jocelin Núñez is a 64-year-old morbidly obese female, who is intubated on

the vent, remains agitated, still on Diprivan and fentanyl. 



OBJECTIVE:  VITAL SIGNS:  Pulse 118, blood pressure 171/79, sats 98%, respirations

15.  I's and O's in the last 24 hours have been 3168 in and 2130 out.  All cultures

are so far negative. 

CHEST:  Decreased breath sounds.  No wheezing.  No crackles. 

CARDIAC:  No gallops.  No murmurs.  Sinus tach. 

ABDOMEN:  Massive. 

EXTREMITIES:  Chronic stasis changes.



LABORATORY DATA:  White count 21,000, H and H 10 and 31, platelet count 319.  PO2 is

60, pCO2 of __________.  Sodium 152, BUN and creatinine are 46 and 1.2. 



Glucose is elevated.



ASSESSMENT AND PLAN:  Respiratory failure, morbid obesity.  Chronic stasis, probably

diastolic dysfunction, probably sleep apnea, probably bilateral pleural effusion. 



CT of the chest is being ordered.  Continue steroids, neb treatment, nutrition, PT.

She is not weanable.  Probably discontinue vancomycin tomorrow if her cultures are

negative. 



Overall, prognosis is grave.  Family wants everything to be done. 



This is one-half hour of critical time.







Job ID:  [id number]

## 2020-05-20 LAB
ANALYZER IN CARDIO: (no result)
ANION GAP SERPL CALC-SCNC: 13 MMOL/L (ref 10–20)
BASE EXCESS STD BLDA CALC-SCNC: 5.7 MEQ/L
BUN SERPL-MCNC: 43 MG/DL (ref 9.8–20.1)
CA-I BLDA-SCNC: 1.14 MMOL/L (ref 1.12–1.3)
CALCIUM SERPL-MCNC: 7.9 MG/DL (ref 7.8–10.44)
CHLORIDE SERPL-SCNC: 109 MMOL/L (ref 98–107)
CO2 SERPL-SCNC: 30 MMOL/L (ref 23–31)
CREAT CL PREDICTED SERPL C-G-VRATE: 84 ML/MIN (ref 70–130)
GLUCOSE SERPL-MCNC: 353 MG/DL (ref 80–115)
HCO3 BLDA-SCNC: 30.7 MEQ/L (ref 22–28)
HCT VFR BLDA CALC: 29 % (ref 36–47)
HGB BLD-MCNC: 9.5 G/DL (ref 12–16)
HGB BLDA-MCNC: 9.9 G/DL (ref 12–16)
INR PPP: 1.2
MAGNESIUM SERPL-MCNC: 2.1 MG/DL (ref 1.6–2.6)
MCH RBC QN AUTO: 31.5 PG (ref 27–31)
MCV RBC AUTO: 101 FL (ref 78–98)
MDIFF COMPLETE?: YES
PCO2 BLDA: 47 MMHG (ref 35–45)
PH BLDA: 7.43 [PH] (ref 7.35–7.45)
PLATELET # BLD AUTO: 347 THOU/UL (ref 130–400)
PO2 BLDA: 85.1 MMHG (ref 80–?)
POTASSIUM BLD-SCNC: 4.17 MMOL/L (ref 3.7–5.3)
POTASSIUM SERPL-SCNC: 4.4 MMOL/L (ref 3.5–5.1)
PROTHROMBIN TIME: 15 SEC (ref 12–14.7)
RBC # BLD AUTO: 3 MILL/UL (ref 4.2–5.4)
SODIUM SERPL-SCNC: 148 MMOL/L (ref 136–145)
SPECIMEN DRAWN FROM PATIENT: (no result)
VANCOMYCIN SERPL-MCNC: 18.7 UG/ML
WBC # BLD AUTO: 18.7 THOU/UL (ref 4.8–10.8)

## 2020-05-20 RX ADMIN — Medication SCH ML: at 08:50

## 2020-05-20 RX ADMIN — NITROGLYCERIN SCH INCH: 20 OINTMENT TOPICAL at 14:10

## 2020-05-20 RX ADMIN — Medication SCH ML: at 20:29

## 2020-05-20 RX ADMIN — INSULIN LISPRO PRN UNIT: 100 INJECTION, SOLUTION INTRAVENOUS; SUBCUTANEOUS at 18:19

## 2020-05-20 RX ADMIN — FAMOTIDINE SCH MG: 10 INJECTION, SOLUTION INTRAVENOUS at 08:48

## 2020-05-20 RX ADMIN — NITROGLYCERIN SCH INCH: 20 OINTMENT TOPICAL at 20:25

## 2020-05-20 RX ADMIN — FAMOTIDINE SCH MG: 10 INJECTION, SOLUTION INTRAVENOUS at 20:29

## 2020-05-20 RX ADMIN — INSULIN LISPRO PRN UNIT: 100 INJECTION, SOLUTION INTRAVENOUS; SUBCUTANEOUS at 05:03

## 2020-05-20 RX ADMIN — INSULIN HUMAN SCH UNIT: 100 INJECTION, SUSPENSION SUBCUTANEOUS at 08:50

## 2020-05-20 RX ADMIN — INSULIN LISPRO PRN UNIT: 100 INJECTION, SOLUTION INTRAVENOUS; SUBCUTANEOUS at 11:54

## 2020-05-20 RX ADMIN — INSULIN LISPRO PRN UNIT: 100 INJECTION, SOLUTION INTRAVENOUS; SUBCUTANEOUS at 08:08

## 2020-05-20 RX ADMIN — INSULIN HUMAN SCH UNIT: 100 INJECTION, SUSPENSION SUBCUTANEOUS at 20:29

## 2020-05-20 NOTE — PRG
DATE OF SERVICE:  05/20/2020



SUBJECTIVE:  This morning, a 64-year-old morbidly obese female.  She is a little bit

more responsive.  BMI is 40, she is 245 pounds. 



OBJECTIVE:  VITAL SIGNS:  Pulse 115, blood pressure 170/80, saturations 90%, and

respirations 17.  She is still on Precedex and low-dose fentanyl.  Her I's and O's

have been consistently positive. 

CHEST:  Decreased breath sounds.  No wheezing. 

CARDIAC:  Normal S1 and S2.  No gallops. 

ABDOMEN:  No masses.



LABORATORY DATA:  White count 18,000, H and H 9 and 30, and platelet count 347.  A

pO2 is 85, pCO2 is __________.  BUN and creatinine are much improved. 



ASSESSMENT AND PLAN:  Azotemia, morbid obesity, respiratory failure, diabetes,

previous cerebrovascular accident, and pneumonia. 



She is still not weanable though we are going to adjust vent today.  Continue

nutrition and PT.  BNP is only 68 suggesting more than likely her pleural effusion,

could be from low albumin, which is 2.4 on admission.  Overall prognosis is grave. 



We will follow. 



One-half hour of critical care time.







Job ID:  [id number]

## 2020-05-20 NOTE — RAD
PORTABLE CHEST 1 VIEW:

 

DATE: 5/20/2020.

TIME: 4:12 a.m.

 

HISTORY: 

Respiratory failure.

 

FINDINGS/IMPRESSION: 

No significant interval change is seen since the previous day's exam.

 

POS: MYNOR

## 2020-05-21 LAB
ANION GAP SERPL CALC-SCNC: 12 MMOL/L (ref 10–20)
BUN SERPL-MCNC: 33 MG/DL (ref 9.8–20.1)
CALCIUM SERPL-MCNC: 8.3 MG/DL (ref 7.8–10.44)
CHLORIDE SERPL-SCNC: 107 MMOL/L (ref 98–107)
CO2 SERPL-SCNC: 35 MMOL/L (ref 23–31)
CREAT CL PREDICTED SERPL C-G-VRATE: 106 ML/MIN (ref 70–130)
GLUCOSE SERPL-MCNC: 116 MG/DL (ref 80–115)
HGB BLD-MCNC: 10.1 G/DL (ref 12–16)
MCH RBC QN AUTO: 32.3 PG (ref 27–31)
MCV RBC AUTO: 99.5 FL (ref 78–98)
MDIFF COMPLETE?: YES
PLATELET # BLD AUTO: 406 THOU/UL (ref 130–400)
POTASSIUM SERPL-SCNC: 3.2 MMOL/L (ref 3.5–5.1)
RBC # BLD AUTO: 3.13 MILL/UL (ref 4.2–5.4)
SODIUM SERPL-SCNC: 151 MMOL/L (ref 136–145)
WBC # BLD AUTO: 22.7 THOU/UL (ref 4.8–10.8)

## 2020-05-21 RX ADMIN — NICARDIPINE HYDROCHLORIDE SCH MLS: 25 INJECTION INTRAVENOUS at 05:13

## 2020-05-21 RX ADMIN — NITROGLYCERIN SCH INCH: 20 OINTMENT TOPICAL at 08:22

## 2020-05-21 RX ADMIN — INSULIN LISPRO PRN UNIT: 100 INJECTION, SOLUTION INTRAVENOUS; SUBCUTANEOUS at 12:28

## 2020-05-21 RX ADMIN — NITROGLYCERIN SCH INCH: 20 OINTMENT TOPICAL at 19:27

## 2020-05-21 RX ADMIN — INSULIN LISPRO PRN UNIT: 100 INJECTION, SOLUTION INTRAVENOUS; SUBCUTANEOUS at 00:25

## 2020-05-21 RX ADMIN — Medication SCH ML: at 09:33

## 2020-05-21 RX ADMIN — INSULIN LISPRO PRN UNIT: 100 INJECTION, SOLUTION INTRAVENOUS; SUBCUTANEOUS at 18:30

## 2020-05-21 RX ADMIN — FAMOTIDINE SCH MG: 10 INJECTION, SOLUTION INTRAVENOUS at 20:36

## 2020-05-21 RX ADMIN — POTASSIUM CHLORIDE AND SODIUM CHLORIDE SCH MLS: 450; 150 INJECTION, SOLUTION INTRAVENOUS at 09:38

## 2020-05-21 RX ADMIN — INSULIN HUMAN SCH UNIT: 100 INJECTION, SUSPENSION SUBCUTANEOUS at 09:33

## 2020-05-21 RX ADMIN — INSULIN LISPRO PRN UNIT: 100 INJECTION, SOLUTION INTRAVENOUS; SUBCUTANEOUS at 20:38

## 2020-05-21 RX ADMIN — NICARDIPINE HYDROCHLORIDE SCH MLS: 25 INJECTION INTRAVENOUS at 07:34

## 2020-05-21 RX ADMIN — INSULIN HUMAN SCH UNIT: 100 INJECTION, SUSPENSION SUBCUTANEOUS at 20:36

## 2020-05-21 RX ADMIN — INSULIN LISPRO PRN UNIT: 100 INJECTION, SOLUTION INTRAVENOUS; SUBCUTANEOUS at 23:53

## 2020-05-21 RX ADMIN — POTASSIUM CHLORIDE AND SODIUM CHLORIDE SCH MLS: 450; 150 INJECTION, SOLUTION INTRAVENOUS at 21:52

## 2020-05-21 RX ADMIN — Medication SCH ML: at 20:38

## 2020-05-21 RX ADMIN — FAMOTIDINE SCH MG: 10 INJECTION, SOLUTION INTRAVENOUS at 09:23

## 2020-05-21 RX ADMIN — POTASSIUM CHLORIDE PRN MLS: 29.8 INJECTION, SOLUTION INTRAVENOUS at 05:13

## 2020-05-21 RX ADMIN — NITROGLYCERIN SCH INCH: 20 OINTMENT TOPICAL at 02:07

## 2020-05-21 RX ADMIN — NITROGLYCERIN SCH INCH: 20 OINTMENT TOPICAL at 13:19

## 2020-05-21 NOTE — PRG
DATE OF SERVICE:  05/21/2020



SUBJECTIVE:  This morning, the patient is awake, responsive, is still remains very

weak. 



OBJECTIVE:  VITAL SIGNS:  Pulse 100, respirations 28, saturations 98 on 3 L, and

blood pressure 190/79.  Her I's and O's have been consistently ahead. 

LUNGS:  She has had extensive rhonchi bilaterally. 

CARDIAC:  Normal S1 and S2.  No gallops. 

ABDOMEN:  No masses.



LABORATORY DATA:  Creatinine is normal.  BUN is 33, sodium is 151, bicarb is 35, and

glucose 180.  White count 20,000. 



IMPRESSION:  Respiratory failure, retained secretions, morbid obesity,

cerebrovascular accident, small pleural effusion with normal ejection fraction. 



PLAN:  Concerned about pulmonary status since she is not coughing effectively. 



__________ 



Continue IV fluids.  Continue neb treatments and supportive care.  She may require

therapeutic bronchoscopy later on, if she still has significant secretions and a

trial of noninvasive ventilation. 



This is one-half hour of critical care time.







Job ID:  [id number]

## 2020-05-21 NOTE — PDOC.HOSPP
- Subjective


Encounter Date: 05/21/20


Encounter Time: 14:30


Subjective: 





Patient seen and examined for Resp failure. Overnight events noted. SOB 

improving. NG placed for meds. SOB improving.





- Objective


Vital Signs & Weight: 


 Vital Signs (12 hours)











  Temp Pulse Resp BP Pulse Ox


 


 05/21/20 14:10   110 H  23 H   99


 


 05/21/20 12:00  98.4 F     94 L


 


 05/21/20 11:46   106 H   


 


 05/21/20 11:44     178/84 H 


 


 05/21/20 10:52   106 H   


 


 05/21/20 10:50   106 H  27 H   99


 


 05/21/20 09:31     197/113 H 


 


 05/21/20 09:26   100   197/113 H 


 


 05/21/20 09:20      97


 


 05/21/20 08:00  97.5 F L    


 


 05/21/20 07:13      98


 


 05/21/20 07:09   100  28 H   96


 


 05/21/20 05:24     197/113 H 


 


 05/21/20 05:23     197/113 H 


 


 05/21/20 04:00  99.4 F    








 Weight











Admit Weight                   243 lb


 


Weight                         233 lb 14.567 oz











 Most Recent Monitor Data











Heart Rate from ECG            110


 


NIBP                           174/97


 


NIBP BP-Mean                   122


 


Respiration from ECG           25


 


SpO2                           95














I&O: 


 











 05/20/20 05/21/20 05/22/20





 06:59 06:59 06:59


 


Intake Total 2960.8 1896 865


 


Output Total 2750 5905 1945


 


Balance 210.8 -4009 -1080











Result Diagrams: 


 05/21/20 03:50





 05/21/20 03:50


Additional Labs: 


 Accuchecks











  05/21/20 05/21/20 05/21/20





  12:28 08:32 00:27


 


POC Glucose  263 H  230 H  180 H














  05/20/20 05/20/20





  20:28 17:12


 


POC Glucose  178 H  187 H








 Laboratory Tests











  05/17/20 05/20/20





  17:10 07:32


 


ABG pH   7.43


 


ABG pCO2  63.4 H*  47.0 H


 


ABG pO2  46.0 L*  85.1 H











Radiology Reviewed by me: Yes (CXR - reviewed)


EKG Reviewed by me: Yes (Tele ST)





Hospitalist ROS





- Review of Systems


Gastrointestinal: denies: nausea, vomiting, abdominal pain, diarrhea, 

constipation, melena, hematochezia, other


Genitourinary: denies: dysuria, frequency, incontinence, hematuria, retention, 

other





- Medication


Medications: 


Active Medications











Generic Name Dose Route Start Last Admin





  Trade Name Freq  PRN Reason Stop Dose Admin


 


Albuterol/Ipratropium  3 ml  05/21/20 10:30  05/21/20 14:10





  Duoneb  EZPAP   3 ml





  [id number] ANNIE   Administration





     





     





     





     


 


Amlodipine Besylate  10 mg  05/19/20 09:00  05/21/20 09:26





  Norvasc  PO   10 mg





  DAILY ANNIE   Administration





     





     





     





     


 


Clonidine  0.2 mg  05/19/20 18:00  05/21/20 11:44





  Catapres  PO   0.2 mg





  Q6HR ANNIE   Administration





     





     





     





     


 


Clonidine  0.1 mg  05/20/20 20:00  05/20/20 20:25





  Catapres-Tts-1 Patch  TD   0.1 mg





  Q7D@2000 ANNIE   Administration





     





     





     





     


 


Docusate Sodium  100 mg  05/19/20 09:00  05/21/20 09:31





  Colace  PO   100 mg





  DAILY ANNIE   Administration





     





     





     





     


 


Enalaprilat  2.5 mg  05/20/20 21:00  05/21/20 14:48





  Vasotec  SLOW IVP   2.5 mg





  0300,0900,1500,2100 ANNIE   Administration





     





     





     





     


 


Enoxaparin Sodium  30 mg  05/19/20 09:00  05/21/20 09:25





  Lovenox  SC   30 mg





  0900,2100 ANNIE   Administration





     





     





     





     


 


Famotidine  20 mg  05/18/20 09:00  05/21/20 09:23





  Pepcid  SLOW IVP   20 mg





  BID ANNIE   Administration





     





     





     





     


 


Hydralazine HCl  10 mg  05/19/20 09:12  05/21/20 10:52





  Apresoline  SLOW IVP   10 mg





  Q4H PRN   Administration





  SBP Greater Than 180   





     





     





     


 


Hydralazine HCl  25 mg  05/19/20 18:00  05/21/20 11:46





  Apresoline  PO   25 mg





  Q6HR ANNIE   Administration





     





     





     





     


 


Cefepime HCl 2 gm/ Sodium  100 mls @ 200 mls/hr  05/17/20 17:00  05/21/20 05:14





  Chloride  IVPB   100 mls





  0500,1700 ANNIE   Administration





     





     





     





     


 


Potassium Chloride 40 meq/  100 mls @ 50 mls/hr  05/17/20 17:05  05/21/20 05:13





  Device  IVPB   100 mls





  ASDIR PRN   Administration





  FOR SERUM K+ 2.5 - 3.5   





     





     





     


 


Potassium Chloride/Sodium Chloride  1,000 mls @ 75 mls/hr  05/21/20 08:45  05/21 /20 09:38





  1/2 Ns W/Kcl 20 Meq  IV   1,000 mls





  .[id number] ANNIE   Administration





     





     





     





     


 


Insulin Human Lispro  0 units  05/19/20 01:04  05/21/20 12:28





  Humalog  SC   9 unit





  .AGGRESSIVE SLIDING PRN   Administration





  AGGRESSIVE SLIDING SCALE   





     





  Protocol   





     


 


Insulin Human NPH  35 unit  05/20/20 09:00  05/21/20 09:33





  Humulin N  SC   35 unit





  BID ANNIE   Administration





     





     





     





     


 


Lisinopril  40 mg  05/19/20 09:00  05/21/20 09:26





  Zestril  PO   40 mg





  DAILY ANNIE   Administration





     





     





     





     


 


Methylprednisolone Sodium Succinate  40 mg  05/20/20 09:00  05/21/20 09:20





  Solu-Medrol  IVP   40 mg





  DAILY ANNIE   Administration





     





     





     





     


 


Metoprolol Tartrate  5 mg  05/21/20 00:03  05/21/20 00:25





  Lopressor  IVP   5 mg





  Q6H PRN   Administration





  SBP GREATER THAN 160   





     





     





     


 


Nitroglycerin  0.5 inch  05/20/20 13:30  05/21/20 13:19





  Nitro-Bid 2% Ointment  TOP   0.5 inch





  Q6H ANNIE   Administration





     





     





     





     


 


Saccharomyces Boulardii  250 mg  05/20/20 09:00  05/21/20 09:26





  Florastor  PO   250 mg





  DAILY ANNIE   Administration





     





     





     





     


 


Sodium Chloride  10 ml  05/18/20 21:00  05/21/20 09:33





  Flush - Normal Saline  IVF   10 ml





  Q12HR ANNIE   Administration





     





     





     





     














- Exam


General Appearance: ill appearing


General - other findings: Mild resp distress


Respiratory: rales, rhonchi, wheezes


Gastrointestinal: soft, non-tender, non-distended, normal bowel sounds


Extremities: no cyanosis, no clubbing


Extremities - other findings: RLE edema with some erythema


Neurological: no new deficit


Psychiatric: normal affect, A&O x 3





Hosp A/P





- Plan


DVT proph w/lovenox, DVT proph w/SCDs





Severe Sepsis 2/2 to RLE Cellulitis


Acute respiratory failure with hypoxia/hypercapnia due to retained secretions ?

Aspiration Pneumonia - extubated 5/20


Hypertension 


DM2


Hypernatremia - improving


Dementia


h/o CVA


Mild persistent Asthma


Depression


Chronic Anticoagulation


Morbid Obesity BMI 40.9


CKD 3





PLAN:


Cont IV Cefepime


Reduce NPH to 25 BID


Cont Aggressive sliding scale


On IV Steroids daily


Cont Clonidine


Cont Hydralazine


Amlodipine/Lisinopril added today


DC IV Vasotec


Cont other meds as above


AM labs


Cont supportive care

## 2020-05-21 NOTE — PDOC.HOSPP
- Subjective


Encounter Date: 05/20/20


Encounter Time: 16:30


Subjective: 





Patient seen and examined for Resp failure. Extubated. No new complaints. No 

overnight events





- Objective


Vital Signs & Weight: 


 Vital Signs (12 hours)











  Temp Pulse Resp BP Pulse Ox


 


 05/21/20 07:13      98


 


 05/21/20 07:09   100  28 H   96


 


 05/21/20 05:24     197/113 H 


 


 05/21/20 05:23     197/113 H 


 


 05/21/20 04:00  99.4 F    


 


 05/21/20 02:08     197/113 H 


 


 05/21/20 01:51   103 H  20   96


 


 05/21/20 01:49      96


 


 05/21/20 00:37   123 H   167/111 H 


 


 05/21/20 00:00  99.8 F H    


 


 05/20/20 22:05   123 H  22 H   97








 Weight











Admit Weight                   243 lb


 


Weight                         233 lb 14.567 oz











 Most Recent Monitor Data











Heart Rate from ECG            107


 


NIBP                           190/79


 


NIBP BP-Mean                   116


 


Respiration from ECG           25


 


SpO2                           96














I&O: 


 











 05/20/20 05/21/20 05/22/20





 06:59 06:59 06:59


 


Intake Total 2960.8 1896 


 


Output Total 2750 5905 


 


Balance 210.8 -4009 











Result Diagrams: 


 05/21/20 03:50





 05/21/20 03:50


Additional Labs: 


 Accuchecks











  05/21/20 05/21/20 05/20/20





  08:32 00:27 20:28


 


POC Glucose  230 H  180 H  178 H














  05/20/20 05/20/20





  17:12 11:56


 


POC Glucose  187 H  226 H











Radiology Reviewed by me: Yes (CXR - reviewed)


EKG Reviewed by me: Yes (Tele ST)





Hospitalist ROS





- Review of Systems


Cardiovascular: reports: edema.  denies: chest pain, palpitations, orthopnea, 

paroxysmal noc. dyspnea, light headedness, other


Gastrointestinal: denies: nausea, vomiting, abdominal pain, diarrhea, 

constipation, melena, hematochezia, other





- Medication


Medications: 


Active Medications











Generic Name Dose Route Start Last Admin





  Trade Name Freq  PRN Reason Stop Dose Admin


 


Albuterol/Ipratropium  3 ml  05/18/20 10:30  05/21/20 07:09





  Duoneb  NEB   3 ml





  [id number] ANNIE   Administration





     





     





     





     


 


Amlodipine Besylate  10 mg  05/19/20 09:00  05/20/20 08:49





  Norvasc  PO   10 mg





  DAILY ANNIE   Administration





     





     





     





     


 


Clonidine  0.2 mg  05/19/20 18:00  05/21/20 05:23





  Catapres  PO   Not Given





  Q6HR ANNIE   





     





     





     





     


 


Clonidine  0.1 mg  05/20/20 20:00  05/20/20 20:25





  Catapres-Tts-1 Patch  TD   0.1 mg





  Q7D@2000 ANNIE   Administration





     





     





     





     


 


Docusate Sodium  100 mg  05/19/20 09:00  05/20/20 08:49





  Colace  PO   100 mg





  DAILY ANNIE   Administration





     





     





     





     


 


Enalaprilat  2.5 mg  05/20/20 21:00  05/21/20 02:08





  Vasotec  SLOW IVP   2.5 mg





  0300,0900,1500,2100 ANNIE   Administration





     





     





     





     


 


Enoxaparin Sodium  30 mg  05/19/20 09:00  05/20/20 20:28





  Lovenox  SC   30 mg





  0900,2100 ANNIE   Administration





     





     





     





     


 


Famotidine  20 mg  05/18/20 09:00  05/20/20 20:29





  Pepcid  SLOW IVP   20 mg





  BID ANNIE   Administration





     





     





     





     


 


Hydralazine HCl  25 mg  05/19/20 18:00  05/21/20 05:24





  Apresoline  PO   Not Given





  Q6HR [facility]   





     





     





     





     


 


Cefepime HCl 2 gm/ Sodium  100 mls @ 200 mls/hr  05/17/20 17:00  05/21/20 05:14





  Chloride  IVPB   100 mls





  0500,1700 ANNIE   Administration





     





     





     





     


 


Potassium Chloride 40 meq/  100 mls @ 50 mls/hr  05/17/20 17:05  05/21/20 05:13





  Device  IVPB   100 mls





  ASDIR PRN   Administration





  FOR SERUM K+ 2.5 - 3.5   





     





     





     


 


Nicardipine HCl 25 mg/ Sodium  250 mls @ 0 mls/hr  05/21/20 05:00  05/21/20 07:

34





  Chloride  IVPB   250 mls





  INF ANNIE   Administration





     





     





  Protocol   





  Titrate   


 


Insulin Human Lispro  0 units  05/19/20 01:04  05/21/20 00:25





  Humalog  SC   3 unit





  .AGGRESSIVE SLIDING PRN   Administration





  AGGRESSIVE SLIDING SCALE   





     





  Protocol   





     


 


Insulin Human NPH  35 unit  05/20/20 09:00  05/20/20 20:29





  Humulin N  SC   35 unit





  BID ANNIE   Administration





     





     





     





     


 


Lisinopril  40 mg  05/19/20 09:00  05/20/20 08:48





  Zestril  PO   40 mg





  DAILY ANNIE   Administration





     





     





     





     


 


Methylprednisolone Sodium Succinate  40 mg  05/20/20 09:00  05/20/20 08:49





  Solu-Medrol  IVP   40 mg





  DAILY ANNIE   Administration





     





     





     





     


 


Metoprolol Tartrate  5 mg  05/21/20 00:03  05/21/20 00:25





  Lopressor  IVP   5 mg





  Q6H PRN   Administration





  SBP GREATER THAN 160   





     





     





     


 


Nitroglycerin  0.5 inch  05/20/20 13:30  05/21/20 08:22





  Nitro-Bid 2% Ointment  TOP   0.5 inch





  Q6H ANNIE   Administration





     





     





     





     


 


Saccharomyces Boulardii  250 mg  05/20/20 09:00  05/20/20 08:49





  Florastor  PO   250 mg





  DAILY ANNIE   Administration





     





     





     





     


 


Sodium Chloride  10 ml  05/18/20 21:00  05/20/20 20:29





  Flush - Normal Saline  IVF   10 ml





  Q12HR ANNIE   Administration





     





     





     





     














- Exam


General Appearance: ill appearing


Heart: no gallops, no rubs


Respiratory: rales, rhonchi


Gastrointestinal: soft, non-tender, no guarding, no rigidity


Extremities: no cyanosis


Extremities - other findings: Improving RLE edema/tenderness





Hosp A/P





- Plan


DVT proph w/lovenox





Severe Sepsis 2/2 to RLE Cellulitis


Acute respiratory failure with hypoxia/hypercapnia - extubated 5/20


Hypertension 


DM2


Hypernatremia - improving


Dementia


h/o CVA


Mild persistent Asthma


Depression


Chronic Anticoagulation


Morbid Obesity BMI 40.9


CKD 3





PLAN:


Cont IV Atbx - Vancomycin/Cefepime


Monitor Vancomycin level


Reduce IVF


Increase NPH


Cont Aggressive sliding scale


On IV Steroids


Cont Clonidine


Cont Hydralazine


Cont supportive care


Cont other meds as above


AM labs

## 2020-05-21 NOTE — RAD
EXAM: 

CHEST ONE VIEW



HISTORY:

On ventilator. Follow-up evaluation.



COMPARISON:

5/20/2020



FINDINGS:

Nasogastric tube and endotracheal tube have been removed. Left subclavian central venous catheter rem
ains in place. Bilateral pleural effusions are again seen greater on the right with associated

parenchymal opacity probably attributable to passive atelectasis; although, pneumonia at either lung 
base could not be entirely excluded. Calcified granuloma right midlung zone is again seen. No other

interval change



IMPRESSION:

Overall stable chest with bilateral pleural effusions greater on the right with associated parenchyma
l opacities probably attributable to associated passive atelectasis. Infiltrate/pneumonia at either

lung base cannot be entirely excluded.



Reported By: Bradford Larkin 

Electronically Signed:  5/21/2020 7:49 AM

## 2020-05-22 LAB
ANION GAP SERPL CALC-SCNC: 11 MMOL/L (ref 10–20)
BUN SERPL-MCNC: 38 MG/DL (ref 9.8–20.1)
CALCIUM SERPL-MCNC: 8.3 MG/DL (ref 7.8–10.44)
CHLORIDE SERPL-SCNC: 109 MMOL/L (ref 98–107)
CO2 SERPL-SCNC: 33 MMOL/L (ref 23–31)
CREAT CL PREDICTED SERPL C-G-VRATE: 86 ML/MIN (ref 70–130)
GLUCOSE SERPL-MCNC: 215 MG/DL (ref 80–115)
HGB BLD-MCNC: 9.3 G/DL (ref 12–16)
MCH RBC QN AUTO: 31.6 PG (ref 27–31)
MCV RBC AUTO: 101 FL (ref 78–98)
MDIFF COMPLETE?: YES
PLATELET # BLD AUTO: 354 THOU/UL (ref 130–400)
POTASSIUM SERPL-SCNC: 4.1 MMOL/L (ref 3.5–5.1)
RBC # BLD AUTO: 2.94 MILL/UL (ref 4.2–5.4)
SODIUM SERPL-SCNC: 149 MMOL/L (ref 136–145)
WBC # BLD AUTO: 18.6 THOU/UL (ref 4.8–10.8)

## 2020-05-22 RX ADMIN — INSULIN HUMAN SCH UNIT: 100 INJECTION, SUSPENSION SUBCUTANEOUS at 09:47

## 2020-05-22 RX ADMIN — FAMOTIDINE SCH MG: 10 INJECTION, SOLUTION INTRAVENOUS at 09:44

## 2020-05-22 RX ADMIN — INSULIN LISPRO PRN UNIT: 100 INJECTION, SOLUTION INTRAVENOUS; SUBCUTANEOUS at 08:55

## 2020-05-22 RX ADMIN — POTASSIUM CHLORIDE AND SODIUM CHLORIDE SCH MLS: 450; 150 INJECTION, SOLUTION INTRAVENOUS at 12:25

## 2020-05-22 RX ADMIN — INSULIN LISPRO PRN UNIT: 100 INJECTION, SOLUTION INTRAVENOUS; SUBCUTANEOUS at 16:59

## 2020-05-22 RX ADMIN — INSULIN HUMAN SCH UNIT: 100 INJECTION, SUSPENSION SUBCUTANEOUS at 20:22

## 2020-05-22 RX ADMIN — INSULIN LISPRO PRN UNIT: 100 INJECTION, SOLUTION INTRAVENOUS; SUBCUTANEOUS at 05:16

## 2020-05-22 RX ADMIN — NITROGLYCERIN SCH INCH: 20 OINTMENT TOPICAL at 08:55

## 2020-05-22 RX ADMIN — Medication SCH ML: at 20:31

## 2020-05-22 RX ADMIN — NITROGLYCERIN SCH INCH: 20 OINTMENT TOPICAL at 14:09

## 2020-05-22 RX ADMIN — INSULIN LISPRO PRN UNIT: 100 INJECTION, SOLUTION INTRAVENOUS; SUBCUTANEOUS at 12:23

## 2020-05-22 RX ADMIN — Medication SCH ML: at 09:48

## 2020-05-22 RX ADMIN — NITROGLYCERIN SCH INCH: 20 OINTMENT TOPICAL at 00:50

## 2020-05-22 RX ADMIN — INSULIN LISPRO PRN UNIT: 100 INJECTION, SOLUTION INTRAVENOUS; SUBCUTANEOUS at 20:23

## 2020-05-22 RX ADMIN — NITROGLYCERIN SCH INCH: 20 OINTMENT TOPICAL at 19:44

## 2020-05-22 NOTE — RAD
PORTABLE CHEST:

 

Date:  05/22/2020

 

INDICATION:

On ventilation. CCU follow-up. 

 

COMPARISON:  

05/21/2020. 

 

FINDINGS:

The bilateral effusions and hazy bibasilar infiltrates are less pronounced today. Upper lung zones ar
e clear. Central line and NG tube unchanged. 

 

IMPRESSION: 

Improvement in the chest since yesterday. 

 

 

POS: [facility]

## 2020-05-22 NOTE — PRG
DATE OF SERVICE:  05/22/2020



SUBJECTIVE:  Jocelin Núñez this morning to my surprise, she is awake, alert,

responsive, much improved. 



OBJECTIVE:  VITAL SIGNS:  Temperature is 99, blood pressure 156/81, respiratory rate

18, pulse 80.  I's and O's have been excellent positive. 

CHEST:  No wheezing.  No crackles. 

CARDIAC:  Normal S1 and S2.  No gallops. 

ABDOMEN:  No masses.



LABORATORY DATA:  White count 18,000.  Creatinine is down to 1.3.  X-ray shows much

improvement in the bilateral infiltrates. 



ASSESSMENT:  Respiratory failure, probably aspiration pneumonia, still dysphagia,

severe deconditioning. 



PLAN:  As soon as she is able to swallow, we will switch over to oral medication

including antibiotics and steroids.  Continue neb treatment.  Transfer to MICU later

today.  Pulmonary/Critical Care is following. 



One-half hour of critical care time.







Job ID:  [id number]

## 2020-05-23 LAB
ANION GAP SERPL CALC-SCNC: 11 MMOL/L (ref 10–20)
BUN SERPL-MCNC: 37 MG/DL (ref 9.8–20.1)
CALCIUM SERPL-MCNC: 8.4 MG/DL (ref 7.8–10.44)
CHLORIDE SERPL-SCNC: 108 MMOL/L (ref 98–107)
CO2 SERPL-SCNC: 30 MMOL/L (ref 23–31)
CREAT CL PREDICTED SERPL C-G-VRATE: 87 ML/MIN (ref 70–130)
GLUCOSE SERPL-MCNC: 177 MG/DL (ref 80–115)
HGB BLD-MCNC: 8.8 G/DL (ref 12–16)
HGB BLD-MCNC: 8.9 G/DL (ref 12–16)
HGB BLD-MCNC: 9.3 G/DL (ref 12–16)
MCH RBC QN AUTO: 31.4 PG (ref 27–31)
MCV RBC AUTO: 101 FL (ref 78–98)
MDIFF COMPLETE?: YES
PLATELET # BLD AUTO: 289 THOU/UL (ref 130–400)
POTASSIUM SERPL-SCNC: 4.2 MMOL/L (ref 3.5–5.1)
RBC # BLD AUTO: 2.8 MILL/UL (ref 4.2–5.4)
SODIUM SERPL-SCNC: 145 MMOL/L (ref 136–145)
WBC # BLD AUTO: 18.7 THOU/UL (ref 4.8–10.8)

## 2020-05-23 RX ADMIN — Medication SCH ML: at 20:29

## 2020-05-23 RX ADMIN — NYSTATIN SCH UNITS: 100000 SUSPENSION ORAL at 17:21

## 2020-05-23 RX ADMIN — INSULIN HUMAN SCH UNIT: 100 INJECTION, SUSPENSION SUBCUTANEOUS at 20:12

## 2020-05-23 RX ADMIN — POTASSIUM CHLORIDE AND SODIUM CHLORIDE SCH: 450; 150 INJECTION, SOLUTION INTRAVENOUS at 14:13

## 2020-05-23 RX ADMIN — NITROGLYCERIN SCH INCH: 20 OINTMENT TOPICAL at 09:23

## 2020-05-23 RX ADMIN — INSULIN LISPRO PRN UNIT: 100 INJECTION, SOLUTION INTRAVENOUS; SUBCUTANEOUS at 05:11

## 2020-05-23 RX ADMIN — Medication SCH ML: at 09:45

## 2020-05-23 RX ADMIN — NYSTATIN SCH UNITS: 100000 SUSPENSION ORAL at 14:18

## 2020-05-23 RX ADMIN — NITROGLYCERIN SCH: 20 OINTMENT TOPICAL at 19:23

## 2020-05-23 RX ADMIN — NITROGLYCERIN SCH INCH: 20 OINTMENT TOPICAL at 01:53

## 2020-05-23 RX ADMIN — NYSTATIN SCH UNITS: 100000 SUSPENSION ORAL at 20:10

## 2020-05-23 RX ADMIN — POTASSIUM CHLORIDE AND SODIUM CHLORIDE SCH: 450; 150 INJECTION, SOLUTION INTRAVENOUS at 01:58

## 2020-05-23 RX ADMIN — INSULIN HUMAN SCH UNIT: 100 INJECTION, SUSPENSION SUBCUTANEOUS at 09:26

## 2020-05-23 RX ADMIN — POTASSIUM CHLORIDE AND SODIUM CHLORIDE SCH MLS: 450; 150 INJECTION, SOLUTION INTRAVENOUS at 18:50

## 2020-05-23 RX ADMIN — INSULIN LISPRO PRN UNIT: 100 INJECTION, SOLUTION INTRAVENOUS; SUBCUTANEOUS at 12:35

## 2020-05-23 RX ADMIN — INSULIN LISPRO PRN UNIT: 100 INJECTION, SOLUTION INTRAVENOUS; SUBCUTANEOUS at 01:29

## 2020-05-23 RX ADMIN — NITROGLYCERIN SCH INCH: 20 OINTMENT TOPICAL at 14:15

## 2020-05-23 RX ADMIN — INSULIN LISPRO PRN UNIT: 100 INJECTION, SOLUTION INTRAVENOUS; SUBCUTANEOUS at 17:26

## 2020-05-23 NOTE — PDOC.HOSPP
- Subjective


Encounter Date: 05/23/20


Encounter Time: 12:30


Subjective: 





Patient seen and examined for Resp failure/Sepsis. Tolerating tube feeds. No 

fever/CP/SOB. No new complaints. No overnight events





- Objective


Vital Signs & Weight: 


 Vital Signs (12 hours)











  Temp Pulse Resp BP Pulse Ox


 


 05/23/20 18:30     125/84 


 


 05/23/20 15:38  98.7 F    


 


 05/23/20 14:47   89  22 H   99


 


 05/23/20 12:36   99   152/78 H 


 


 05/23/20 11:51  98.5 F    


 


 05/23/20 10:57   90  19   99


 


 05/23/20 10:00    20  


 


 05/23/20 09:40   90   


 


 05/23/20 09:39     131/65 


 


 05/23/20 09:19      99


 


 05/23/20 09:00    18  


 


 05/23/20 08:01  98 F    


 


 05/23/20 08:00    18  


 


 05/23/20 07:00   95  16   100








 Weight











Admit Weight                   243 lb


 


Weight                         243 lb 2 oz











 Most Recent Monitor Data











Heart Rate from ECG            109


 


NIBP                           125/84


 


NIBP BP-Mean                   97


 


Respiration from ECG           25


 


SpO2                           98














I&O: 


 











 05/22/20 05/23/20 05/24/20





 06:59 06:59 06:59


 


Intake Total 3109 4048 285


 


Output Total 3320 2400 


 


Balance -211 1648 285











Result Diagrams: 


 05/23/20 14:25





 05/23/20 03:27


Additional Labs: 


 Accuchecks











  05/23/20 05/23/20 05/23/20





  15:20 12:10 08:26


 


POC Glucose  251 H  285 H  205 H














  05/23/20 05/23/20 05/23/20





  05:38 04:44 00:00


 


POC Glucose  200 H  201 H  174 H














  05/22/20





  19:50


 


POC Glucose  251 H














Hospitalist ROS





- Review of Systems


Respiratory: denies: cough, dry, shortness of breath, hemoptysis, SOB with 

excertion, pleuritic pain, sputum, wheezing, other


Cardiovascular: denies: chest pain, palpitations, orthopnea, paroxysmal noc. 

dyspnea, edema, light headedness, other





- Medication


Medications: 


Active Medications











Generic Name Dose Route Start Last Admin





  Trade Name Freq  PRN Reason Stop Dose Admin


 


Albuterol/Ipratropium  3 ml  05/21/20 10:30  05/23/20 14:47





  Duoneb  EZPAP   3 ml





  [id number] ANNIE   Administration





     





     





     





     


 


Amlodipine Besylate  10 mg  05/19/20 09:00  05/23/20 09:40





  Norvasc  PO   10 mg





  DAILY ANNIE   Administration





     





     





     





     


 


Clonidine  0.1 mg  05/20/20 20:00  05/20/20 20:25





  Catapres-Tts-1 Patch  TD   0.1 mg





  Q7D@2000 Pending sale to [facility]   Administration





     





     





     





     


 


Docusate Sodium  100 mg  05/19/20 09:00  05/23/20 09:46





  Colace  PO   100 mg





  DAILY ANNIE   Administration





     





     





     





     


 


Enoxaparin Sodium  30 mg  05/19/20 09:00  05/23/20 09:26





  Lovenox  SC   30 mg





  0900,2100 Pending sale to [facility]   Administration





     





     





     





     


 


Hydralazine HCl  10 mg  05/19/20 09:12  05/21/20 22:40





  Apresoline  SLOW IVP   10 mg





  Q4H PRN   Administration





  SBP Greater Than 180   





     





     





     


 


Cefepime HCl 2 gm/ Sodium  100 mls @ 200 mls/hr  05/17/20 17:00  05/23/20 17:23





  Chloride  IVPB   100 mls





  0500,1700 ANNIE   Administration





     





     





     





     


 


Potassium Chloride 40 meq/  100 mls @ 50 mls/hr  05/17/20 17:05  05/21/20 05:13





  Device  IVPB   100 mls





  ASDIR PRN   Administration





  FOR SERUM K+ 2.5 - 3.5   





     





     





     


 


Potassium Chloride/Sodium Chloride  1,000 mls @ 75 mls/hr  05/21/20 08:45  05/23 /20 14:13





  1/2 Ns W/Kcl 20 Meq  IV   Not Given





  .[id number] Pending sale to [facility]   





     





     





     





     


 


Insulin Human Lispro  0 units  05/19/20 01:04  05/23/20 17:26





  Humalog  SC   9 unit





  .AGGRESSIVE SLIDING PRN   Administration





  AGGRESSIVE SLIDING SCALE   





     





  Protocol   





     


 


Insulin Human NPH  25 unit  05/21/20 21:00  05/23/20 09:26





  Humulin N  SC   25 unit





  BID Pending sale to [facility]   Administration





     





     





     





     


 


Lisinopril  40 mg  05/19/20 09:00  05/23/20 09:39





  Zestril  PO   40 mg





  DAILY Pending sale to [facility]   Administration





     





     





     





     


 


Methylprednisolone Sodium Succinate  40 mg  05/20/20 09:00  05/23/20 09:29





  Solu-Medrol  IVP   40 mg





  DAILY Pending sale to [facility]   Administration





     





     





     





     


 


Metoprolol Tartrate  5 mg  05/21/20 00:03  05/21/20 21:49





  Lopressor  IVP   5 mg





  Q6H PRN   Administration





  SBP GREATER THAN 160   





     





     





     


 


Nitroglycerin  0.5 inch  05/20/20 13:30  05/23/20 14:15





  Nitro-Bid 2% Ointment  TOP   0.5 inch





  Q6H ANNIE   Administration





     





     





     





     


 


Nystatin  500,000 units  05/23/20 13:00  05/23/20 17:21





  Mycostatin  SSW   500,000 units





  QID ANNIE   Administration





     





     





     





     


 


Saccharomyces Boulardii  250 mg  05/20/20 09:00  05/23/20 09:40





  Florastor  PO   250 mg





  DAILY ANNIE   Administration





     





     





     





     


 


Sodium Chloride  10 ml  05/18/20 21:00  05/23/20 09:45





  Flush - Normal Saline  IVF   10 ml





  Q12HR ANNIE   Administration





     





     





     





     














- Exam


Neck: supple


Heart: RRR, no gallops


Respiratory: no wheezes, rales


Gastrointestinal: soft, non-tender, normal bowel sounds


Extremities: no cyanosis, no clubbing


Psychiatric: A&O x 3





Hosp A/P





- Plan


DVT proph w/lovenox, DVT proph w/SCDs





Severe Sepsis 2/2 to RLE Cellulitis


Acute respiratory failure with hypoxia/hypercapnia due to retained secretions ?

Aspiration Pneumonia - extubated 5/20


Hypertension 


DM2


Swallow dys - on tube feeds


Hypernatremia - improving


Dementia


h/o CVA


Mild persistent Asthma


Depression


Chronic Anticoagulation


Morbid Obesity BMI 40.9


CKD 3





PLAN:


On [place] water protocol


Cont IV Cefepime


Cont 25 units BID NPH with sliding scale


Reduce Clonidine and Hydralazine


DC NTG patch


Cont Amlodipine/Lisinopril


Cont tube feeds


Cont other meds as above


Cont IMCU monitoring





Update;


Pt had dark BM - hemodynamically stable. Will monitor H/H. Hold Lovenox. Add IV 

PPI. Type and screen.

## 2020-05-23 NOTE — PDOC.HOSPP
- Subjective


Encounter Date: 05/22/20


Encounter Time: 14:00


Subjective: 





Patient seen and examined for resp failure. No new complaints. No overnight 

events





- Objective


Vital Signs & Weight: 


 Vital Signs (12 hours)











  Temp Pulse Resp BP Pulse Ox


 


 05/23/20 08:01  98 F    


 


 05/23/20 07:00   95  16   100


 


 05/23/20 05:11   104 H   138/78 


 


 05/23/20 03:50  97.1 F L    


 


 05/23/20 03:27   101 H  20   99


 


 05/23/20 01:29     158/109 H 


 


 05/23/20 01:28   111 H   158/109 H 


 


 05/22/20 23:37  96.2 F L    


 


 05/22/20 21:50   106 H  17   100








 Weight











Admit Weight                   243 lb


 


Weight                         243 lb 2 oz











 Most Recent Monitor Data











Heart Rate from ECG            94


 


NIBP                           158/78


 


NIBP BP-Mean                   104


 


Respiration from ECG           25


 


SpO2                           100














I&O: 


 











 05/22/20 05/23/20 05/24/20





 06:59 06:59 06:59


 


Intake Total 3109 4048 


 


Output Total 3320 2400 


 


Balance -211 1648 











Result Diagrams: 


 05/23/20 03:27





 05/23/20 03:27


Additional Labs: 


 Accuchecks











  05/23/20 05/23/20 05/23/20





  05:38 04:44 00:00


 


POC Glucose  200 H  201 H  174 H














  05/22/20 05/22/20 05/22/20





  19:50 16:27 11:35


 


POC Glucose  251 H  208 H  235 H














  05/22/20





  08:27


 


POC Glucose  270 H











EKG Reviewed by me: Yes (Tele SR)





Hospitalist ROS





- Review of Systems


Cardiovascular: denies: chest pain, palpitations, orthopnea, paroxysmal noc. 

dyspnea, edema, light headedness, other


Gastrointestinal: denies: nausea, vomiting, abdominal pain, diarrhea, 

constipation, melena, hematochezia, other





- Medication


Medications: 


Active Medications











Generic Name Dose Route Start Last Admin





  Trade Name Freq  PRN Reason Stop Dose Admin


 


Albuterol/Ipratropium  3 ml  05/21/20 10:30  05/23/20 07:00





  Duoneb  EZPAP   3 ml





  [id number] ANNIE   Administration





     





     





     





     


 


Amlodipine Besylate  10 mg  05/19/20 09:00  05/22/20 09:43





  Norvasc  PO   10 mg





  DAILY ANNIE   Administration





     





     





     





     


 


Clonidine  0.2 mg  05/19/20 18:00  05/23/20 05:11





  Catapres  PO   0.2 mg





  Q6HR ANNIE   Administration





     





     





     





     


 


Clonidine  0.1 mg  05/20/20 20:00  05/20/20 20:25





  Catapres-Tts-1 Patch  TD   0.1 mg





  Q7D@2000 ANNIE   Administration





     





     





     





     


 


Docusate Sodium  100 mg  05/19/20 09:00  05/22/20 09:44





  Colace  PO   100 mg





  DAILY ANNIE   Administration





     





     





     





     


 


Enoxaparin Sodium  30 mg  05/19/20 09:00  05/22/20 20:22





  Lovenox  SC   30 mg





  0900,2100 ANNIE   Administration





     





     





     





     


 


Famotidine  20 mg  05/22/20 21:00  05/22/20 20:22





  Pepcid  PO   20 mg





  BID ANNIE   Administration





     





     





     





     


 


Hydralazine HCl  10 mg  05/19/20 09:12  05/21/20 22:40





  Apresoline  SLOW IVP   10 mg





  Q4H PRN   Administration





  SBP Greater Than 180   





     





     





     


 


Hydralazine HCl  25 mg  05/19/20 18:00  05/23/20 05:11





  Apresoline  PO   25 mg





  Q6HR ANNIE   Administration





     





     





     





     


 


Cefepime HCl 2 gm/ Sodium  100 mls @ 200 mls/hr  05/17/20 17:00  05/23/20 05:10





  Chloride  IVPB   100 mls





  0500,1700 ANNIE   Administration





     





     





     





     


 


Potassium Chloride 40 meq/  100 mls @ 50 mls/hr  05/17/20 17:05  05/21/20 05:13





  Device  IVPB   100 mls





  ASDIR PRN   Administration





  FOR SERUM K+ 2.5 - 3.5   





     





     





     


 


Potassium Chloride/Sodium Chloride  1,000 mls @ 75 mls/hr  05/21/20 08:45  05/23 /20 01:58





  1/2 Ns W/Kcl 20 Meq  IV   Not Given





  .[id number] [facility]   





     





     





     





     


 


Insulin Human Lispro  0 units  05/19/20 01:04  05/23/20 05:11





  Humalog  SC   6 unit





  .AGGRESSIVE SLIDING PRN   Administration





  AGGRESSIVE SLIDING SCALE   





     





  Protocol   





     


 


Insulin Human NPH  25 unit  05/21/20 21:00  05/22/20 20:22





  Humulin N  SC   25 unit





  BID ANNIE   Administration





     





     





     





     


 


Lisinopril  40 mg  05/19/20 09:00  05/22/20 09:44





  Zestril  PO   40 mg





  DAILY ANNIE   Administration





     





     





     





     


 


Methylprednisolone Sodium Succinate  40 mg  05/20/20 09:00  05/22/20 09:41





  Solu-Medrol  IVP   40 mg





  DAILY ANNIE   Administration





     





     





     





     


 


Metoprolol Tartrate  5 mg  05/21/20 00:03  05/21/20 21:49





  Lopressor  IVP   5 mg





  Q6H PRN   Administration





  SBP GREATER THAN 160   





     





     





     


 


Nitroglycerin  0.5 inch  05/20/20 13:30  05/23/20 01:53





  Nitro-Bid 2% Ointment  TOP   0.5 inch





  Q6H ANNIE   Administration





     





     





     





     


 


Saccharomyces Boulardii  250 mg  05/20/20 09:00  05/22/20 09:46





  Florastor  PO   250 mg





  DAILY ANNIE   Administration





     





     





     





     


 


Sodium Chloride  10 ml  05/18/20 21:00  05/22/20 20:31





  Flush - Normal Saline  IVF   10 ml





  Q12HR ANNIE   Administration





     





     





     





     














- Exam


General Appearance: NAD


Neck: supple, no JVD


Respiratory: rales, rhonchi, wheezes (scat)


Gastrointestinal: soft, non-tender, non-distended, normal bowel sounds


Extremities: no cyanosis, no clubbing





Hosp A/P





- Plan


DVT proph w/lovenox, DVT proph w/SCDs





Severe Sepsis 2/2 to RLE Cellulitis


Acute respiratory failure with hypoxia/hypercapnia due to retained secretions ?

Aspiration Pneumonia - extubated 5/20


Hypertension 


DM2


Swallow dys - on tube feeds


Hypernatremia - improving


Dementia


h/o CVA


Mild persistent Asthma


Depression


Chronic Anticoagulation


Morbid Obesity BMI 40.9


CKD 3





PLAN:


Cont IV Cefepime


Cont NPH with sliding scale


Cont Steroids


Cont Clonidine/Hydralazine/Amlodipine/Lisinopril


Cont tube feeds


Cont other meds as above


AM labs


Cont supportive care

## 2020-05-23 NOTE — PRG
DATE OF SERVICE:  05/23/2020



SUBJECTIVE:  Ms. Núñez is afebrile.



OBJECTIVE:  VITAL SIGNS:  Heart rate is 99, blood pressure 150/78. 

LUNGS:  Clear. 

HEART:  Regular rhythm. 

ABDOMEN:  Soft. 

EXTREMITIES:  Without edema.



LABORATORY DATA:  White count 18.7, hemoglobin 8.8, and platelets 289.  Sodium 145,

potassium 4.2, chloride 108, bicarb 30, BUN 37, and creatinine 1.12.  Intake and

outputs not recorded.  Chest x-ray shows bibasilar patchy infiltrates. 



IMPRESSION:  Aspiration pneumonia with severe deconditioning, clinically stable at

this point in time. 







Job ID:  [id number]

## 2020-05-23 NOTE — RAD
CHEST 1 VIEW:

 

Date:  05/23/2020

 

HISTORY:  

Respiratory insufficiency. 

 

FINDINGS:

NG tube and left subclavian catheter in place. Mild bilateral vascular congestion with slight bluntin
g of the costophrenic angles, evidence for small pleural effusions. Minimal patchy bibasilar parenchy
mal changes. Appearance is overall stable. 

 

IMPRESSION: 

Stable bibasilar patchy parenchymal changes and probable small pleural effusions. Continue short-term
 follow-up. 

 

 

POS: [facility]

## 2020-05-24 LAB
ANION GAP SERPL CALC-SCNC: 10 MMOL/L (ref 10–20)
BASOPHILS # BLD AUTO: 0 THOU/UL (ref 0–0.2)
BASOPHILS NFR BLD AUTO: 0.1 % (ref 0–1)
BUN SERPL-MCNC: 32 MG/DL (ref 9.8–20.1)
CALCIUM SERPL-MCNC: 8.1 MG/DL (ref 7.8–10.44)
CHLORIDE SERPL-SCNC: 106 MMOL/L (ref 98–107)
CO2 SERPL-SCNC: 29 MMOL/L (ref 23–31)
CREAT CL PREDICTED SERPL C-G-VRATE: 83 ML/MIN (ref 70–130)
EOSINOPHIL # BLD AUTO: 0.3 THOU/UL (ref 0–0.7)
EOSINOPHIL NFR BLD AUTO: 1.8 % (ref 0–10)
GLUCOSE SERPL-MCNC: 194 MG/DL (ref 80–115)
HGB BLD-MCNC: 8.5 G/DL (ref 12–16)
LYMPHOCYTES # BLD: 3.3 THOU/UL (ref 1.2–3.4)
LYMPHOCYTES NFR BLD AUTO: 19.5 % (ref 21–51)
MCH RBC QN AUTO: 31.7 PG (ref 27–31)
MCV RBC AUTO: 100 FL (ref 78–98)
MONOCYTES # BLD AUTO: 1.5 THOU/UL (ref 0.11–0.59)
MONOCYTES NFR BLD AUTO: 8.7 % (ref 0–10)
NEUTROPHILS # BLD AUTO: 11.7 THOU/UL (ref 1.4–6.5)
NEUTROPHILS NFR BLD AUTO: 69.8 % (ref 42–75)
PLATELET # BLD AUTO: 257 THOU/UL (ref 130–400)
POTASSIUM SERPL-SCNC: 4.1 MMOL/L (ref 3.5–5.1)
RBC # BLD AUTO: 2.67 MILL/UL (ref 4.2–5.4)
SODIUM SERPL-SCNC: 141 MMOL/L (ref 136–145)
WBC # BLD AUTO: 16.8 THOU/UL (ref 4.8–10.8)

## 2020-05-24 RX ADMIN — Medication SCH: at 12:21

## 2020-05-24 RX ADMIN — INSULIN LISPRO PRN UNIT: 100 INJECTION, SOLUTION INTRAVENOUS; SUBCUTANEOUS at 01:09

## 2020-05-24 RX ADMIN — NITROGLYCERIN SCH: 20 OINTMENT TOPICAL at 01:10

## 2020-05-24 RX ADMIN — INSULIN HUMAN SCH UNIT: 100 INJECTION, SUSPENSION SUBCUTANEOUS at 10:28

## 2020-05-24 RX ADMIN — NYSTATIN SCH UNITS: 100000 SUSPENSION ORAL at 20:41

## 2020-05-24 RX ADMIN — NITROGLYCERIN SCH: 20 OINTMENT TOPICAL at 15:38

## 2020-05-24 RX ADMIN — INSULIN LISPRO PRN UNIT: 100 INJECTION, SOLUTION INTRAVENOUS; SUBCUTANEOUS at 18:19

## 2020-05-24 RX ADMIN — POTASSIUM CHLORIDE AND SODIUM CHLORIDE SCH MLS: 450; 150 INJECTION, SOLUTION INTRAVENOUS at 10:38

## 2020-05-24 RX ADMIN — NYSTATIN SCH UNITS: 100000 SUSPENSION ORAL at 10:27

## 2020-05-24 RX ADMIN — INSULIN HUMAN SCH UNIT: 100 INJECTION, SUSPENSION SUBCUTANEOUS at 19:57

## 2020-05-24 RX ADMIN — NYSTATIN SCH UNITS: 100000 SUSPENSION ORAL at 13:14

## 2020-05-24 RX ADMIN — NITROGLYCERIN SCH: 20 OINTMENT TOPICAL at 12:03

## 2020-05-24 RX ADMIN — POTASSIUM CHLORIDE AND SODIUM CHLORIDE SCH: 450; 150 INJECTION, SOLUTION INTRAVENOUS at 05:50

## 2020-05-24 RX ADMIN — NYSTATIN SCH UNITS: 100000 SUSPENSION ORAL at 18:19

## 2020-05-24 RX ADMIN — INSULIN LISPRO PRN UNIT: 100 INJECTION, SOLUTION INTRAVENOUS; SUBCUTANEOUS at 13:13

## 2020-05-24 RX ADMIN — INSULIN LISPRO PRN UNIT: 100 INJECTION, SOLUTION INTRAVENOUS; SUBCUTANEOUS at 05:00

## 2020-05-24 RX ADMIN — INSULIN LISPRO PRN UNIT: 100 INJECTION, SOLUTION INTRAVENOUS; SUBCUTANEOUS at 19:56

## 2020-05-24 RX ADMIN — Medication SCH ML: at 22:21

## 2020-05-24 NOTE — PRG
DATE OF SERVICE:  05/24/2020



SUBJECTIVE:  Ms. Núñez had no complaints.  She was lying flat in bed.  __________

when I evaluated her. 



OBJECTIVE:  VITAL SIGNS:  She is afebrile, heart rate 98, respiratory rate 22,

oximetry is 99% on 3 L, blood pressure 160/78. 

LUNGS:  Clear. 

HEART:  Regular rhythm. 

ABDOMEN:  Soft and protuberant. 

EXTREMITIES:  Without edema.



LABORATORY DATA:  White count 16.8, hemoglobin 8.5, platelets 257.  Sodium 141,

potassium 4.1, chloride 106, bicarb 29, BUN 32, creatinine 1.22. 



IMPRESSION:  

1. Aspiration pneumonia, clinically stable.

2. Severe deconditioning.  Placement is the next step. 



Her IV antimicrobial therapy will be discontinued.  She will be switched to p.o.

antibiotics.  She will continue with nebulizer treatments.  She will be also

switched to p.o. steroids today. 







Job ID:  [id number]

## 2020-05-24 NOTE — RAD
ABDOMEN ONE VIEW:

 

History: NG tube placement

 

Comparison: None

 

FINDINGS: 

Enteric tube is place with the tip at the gastric body. Side port is just below the GE junction. 

 

IMPRESSION: 

Satisfactory location of the enteric tube.

 

POS: HOME

## 2020-05-24 NOTE — PDOC.HOSPP
- Subjective


Encounter Date: 05/24/20


Subjective: 


No new complaints today.


No BMs since the morning.





- Objective


Vital Signs & Weight: 


 Vital Signs (12 hours)











  Temp Pulse Pulse Pulse Resp BP BP


 


 05/24/20 15:11   102 H    24 H  


 


 05/24/20 15:08  98.8 F      


 


 05/24/20 14:46   98     


 


 05/24/20 13:14   98     


 


 05/24/20 11:48    100  98   193/81 H  175/85 H


 


 05/24/20 11:15  98.1 F      


 


 05/24/20 11:06   98    22 H  


 


 05/24/20 07:06   103 H    21 H  


 


 05/24/20 07:05  99.3 F      














  Pulse Ox Pulse Ox Pulse Ox


 


 05/24/20 15:11  97  


 


 05/24/20 15:08   


 


 05/24/20 14:46   


 


 05/24/20 13:14   


 


 05/24/20 11:48   98  100


 


 05/24/20 11:15   


 


 05/24/20 11:06  99  


 


 05/24/20 07:06   


 


 05/24/20 07:05   








 Weight











Admit Weight                   243 lb


 


Weight                         239 lb











 Most Recent Monitor Data











Heart Rate from ECG            98


 


NIBP                           159/79


 


NIBP BP-Mean                   105


 


Respiration from ECG           28


 


SpO2                           97














I&O: 


 











 05/23/20 05/24/20 05/25/20





 06:59 06:59 06:59


 


Intake Total 3478 4116 190


 


Output Total 2400 3700 


 


Balance 1078 416 190











Result Diagrams: 


 05/24/20 03:40





 05/24/20 03:40


Additional Labs: 


 Accuchecks











  05/24/20 05/24/20 05/24/20





  16:11 12:09 08:04


 


POC Glucose  372 H  305 H  231 H














  05/24/20 05/24/20 05/23/20





  04:33 00:08 20:20


 


POC Glucose  199 H  225 H  213 H














  05/23/20





  15:20


 


POC Glucose  251 H














Hospitalist ROS





- Medication


Medications: 


Active Medications











Generic Name Dose Route Start Last Admin





  Trade Name Freq  PRN Reason Stop Dose Admin


 


Albuterol/Ipratropium  3 ml  05/21/20 10:30  05/24/20 15:11





  Duoneb  EZPAP   3 ml





  [id number] ANNIE   Administration





     





     





     





     


 


Amlodipine Besylate  10 mg  05/19/20 09:00  05/24/20 12:08





  Norvasc  PO   10 mg





  DAILY ANNIE   Administration





     





     





     





     


 


Clonidine  0.1 mg  05/20/20 20:00  05/20/20 20:25





  Catapres-Tts-1 Patch  TD   0.1 mg





  Q7D@2000 ANNIE   Administration





     





     





     





     


 


Clonidine  0.2 mg  05/23/20 21:00  05/24/20 14:46





  Catapres  PO   0.2 mg





  TID ANNIE   Administration





     





     





     





     


 


Docusate Sodium  100 mg  05/19/20 09:00  05/24/20 12:20





  Colace  PO   Not Given





  DAILY ANNIE   





     





     





     





     


 


Enoxaparin Sodium  30 mg  05/19/20 09:00  05/23/20 09:26





  Lovenox  SC   30 mg





  0900,2100 ANNIE   Administration





     





     





     





     


 


Hydralazine HCl  10 mg  05/19/20 09:12  05/24/20 13:14





  Apresoline  SLOW IVP   10 mg





  Q4H PRN   Administration





  SBP Greater Than 180   





     





     





     


 


Hydralazine HCl  25 mg  05/23/20 21:00  05/24/20 14:46





  Apresoline  PO   25 mg





  TID ANNIE   Administration





     





     





     





     


 


Potassium Chloride 40 meq/  100 mls @ 50 mls/hr  05/17/20 17:05  05/21/20 05:13





  Device  IVPB   100 mls





  ASDIR PRN   Administration





  FOR SERUM K+ 2.5 - 3.5   





     





     





     


 


Potassium Chloride/Sodium Chloride  1,000 mls @ 75 mls/hr  05/21/20 08:45  05/24 /20 10:38





  1/2 Ns W/Kcl 20 Meq  IV   1,000 mls





  .[id number] ANNIE   Administration





     





     





     





     


 


Insulin Human Lispro  0 units  05/19/20 01:04  05/24/20 13:13





  Humalog  SC   11 unit





  .AGGRESSIVE SLIDING PRN   Administration





  AGGRESSIVE SLIDING SCALE   





     





  Protocol   





     


 


Insulin Human NPH  25 unit  05/21/20 21:00  05/24/20 10:28





  Humulin N  SC   25 unit





  BID ANNIE   Administration





     





     





     





     


 


Lisinopril  40 mg  05/19/20 09:00  05/24/20 10:27





  Zestril  PO   40 mg





  DAILY ANNIE   Administration





     





     





     





     


 


Metoprolol Tartrate  5 mg  05/21/20 00:03  05/21/20 21:49





  Lopressor  IVP   5 mg





  Q6H PRN   Administration





  SBP GREATER THAN 160   





     





     





     


 


Nystatin  500,000 units  05/23/20 13:00  05/24/20 13:14





  Mycostatin  SSW   500,000 units





  QID ANNIE   Administration





     





     





     





     


 


Pantoprazole Sodium  40 mg  05/23/20 21:00  05/24/20 10:28





  Protonix  IVP   40 mg





  Q12HR ANNIE   Administration





     





     





     





     


 


Saccharomyces Boricharddii  250 mg  05/20/20 09:00  05/24/20 10:27





  Florastor  PO   250 mg





  DAILY ANNIE   Administration





     





     





     





     


 


Sodium Chloride  10 ml  05/18/20 21:00  05/24/20 12:21





  Flush - Normal Saline  IVF   Not Given





  Q12HR ANNIE   





     





     





     





     














- Exam


General Appearance: awake alert


ENT: normocephalic atraumatic


Neck: supple


Heart: RRR


Respiratory: normal chest expansion, tachypneic


Gastrointestinal: soft, non-tender, non-distended


Neurological: cranial nerve grossly intact





Hosp A/P





- Plan





Severe Sepsis 2/2 to RLE Cellulitis


Acute respiratory failure with hypoxia/hypercapnia due to retained secretions ?

Aspiration Pneumonia - extubated 5/20


GI Bleed


Hypertension 


DM2


Swallow dys - on tube feeds


Hypernatremia - improving


Dementia


h/o CVA


Mild persistent Asthma


Depression


Chronic Anticoagulation


Morbid Obesity BMI 40.9


CKD 3





PLAN:


5/23:


On Nelson water protocol


Cont IV Cefepime


Cont 25 units BID NPH with sliding scale


Reduce Clonidine and Hydralazine


DC NTG patch


Cont Amlodipine/Lisinopril


Cont tube feeds


Cont other meds as above


Cont IMCU monitoring





Update;


Pt had dark BM - hemodynamically stable. Will monitor H/H. Hold Lovenox. Add IV 

PPI. Type and screen.





5/24:


The patient has no new complaints today.


No further episodes of melena today.


She is scheduled for EGD in the morning.


Antibiotics switched to oral cefdinir.


Continue tube feeding until midnight.


Lovenox on hold.

## 2020-05-24 NOTE — CON
DATE OF CONSULTATION:  05/24/2020



REQUESTING PHYSICIAN:  Dr. Bar.



REASON FOR CONSULTATION:  Melena.



HISTORY OF PRESENT ILLNESS:  Jocelin Núñez is a 64-year-old woman with a history of

morbid obesity, also prior CVA and diabetes.  She was initially hospitalized at the

[facility] on 05/13/2020 with right lower extremity cellulitis and

sepsis.  She was transferred here on the 17th with altered mental status and

hypoxia, requiring intubation.  She developed pneumonia, which was eventually

concluded to be aspiration pneumonia with further treatment with antibiotics and

good supportive care.  She was able to be extubated.  She has had some swallowing

dysfunction and has been receiving tube feeds, tolerating these well.  She is not

having any abdominal pain yesterday, though there was note that she had a dark bowel

movement.  I am unsure how many of these stools were witnessed.  The patient herself

does not recall.  Her Lovenox was held.  She has remained hemodynamically stable.

FOBT was checked and is positive.  Evidently, the patient is on chronic

anticoagulation as an outpatient.  There is no known history of peptic ulcer

disease.  PPI was started today, so far per nursing staff, she has not had any stool

output this shift.  There has been no vomiting.  I note that the patient underwent

EGD and colonoscopy way back in 2009.  The EGD was normal and colonoscopy showed

only internal hemorrhoids at that time. 



REVIEW OF SYSTEMS:  Full review of systems including constitutional, head, eyes,

ears, nose, throat, GI, , cardiovascular, respiratory, musculoskeletal, and

neurologic systems is negative except as noted in the HPI. 



PAST MEDICAL HISTORY:  Obesity, CVA, hyperlipidemia, lymphedema, diabetes type 2,

asthma, and right lower extremity cellulitis. 



SOCIAL HISTORY:  She is a former smoker.  Lives in a nursing home.



FAMILY HISTORY:  Noncontributory.



ALLERGIES:  CODEINE.



CURRENT MEDICATIONS:  

1. DuoNeb q.4 hours.

2. Amlodipine.

3. Clonidine 0.2 mg t.i.d.

4. Lovenox 30 mg subcutaneous b.i.d.

5. Hydralazine 25 mg t.i.d.

6. Insulin sliding scale.

7. Lisinopril 40 mg daily.

8. Nystatin swish and swallow q.i.d.

9. Pantoprazole 40 mg IV q.24 hours started yesterday.

10. Florastor 250 mg daily.



PHYSICAL EXAMINATION:

VITAL SIGNS:  Temperature 98.0, pulse 101, blood pressure 163/88, 100% oxygen

saturation on room air. 

GENERAL:  A 64-year-old woman, lying in bed comfortably, in no distress. 

MENTAL:  She is sleepy but arousable.  She is able to conversant and report details

about her symptoms. 

SKIN:  No jaundice and no rashes were palpable. 

EYES:  No scleral icterus.  Extraocular movements intact. 

ENT:  Mucous membranes moist.  No oral lesions. 

LYMPH:  No submandibular or supraclavicular lymphadenopathy.  Thyroid nontender to

palpation. 

HEART:  Regular rate and rhythm. 

LUNGS:  Clear to auscultation bilaterally. 

ABDOMEN:  Obese.  Bowel sounds present.  Soft and nontender to palpation throughout. 

EXTREMITIES:  Trace bilateral pretibial edema. 

NEUROLOGIC:  Extraocular movements intact.



LABORATORY STUDIES:  Hemoglobin was 10.9 on transfer here one week ago.  Over the

past three days, it has dropped from 10.1 to 8.5 this morning.  WBC is 16.8,

platelets 257.  INR 1.2.  Sodium 141, potassium 4.1, BUN 32, creatinine 1.22,

glucose 305. 



IMAGING STUDIES:  Chest x-ray most recently from yesterday shows stable bibasilar

patchy parenchymal changes and small pleural effusions.  Overall stable. 



ASSESSMENT AND PLAN:  

1. Melena, first noted yesterday, appears to have slowed down today.

2. Anemia, possibly from acute blood loss, with hemoglobin declined from 10.1 to 8.5

over the past 3 days. 

3. Aspiration pneumonia, improved.

4. Severe deconditioning.  The patient was started on pantoprazole IV yesterday.

There has been no melena observed today; however, the patient has had significant

decline in hemoglobin over the past few days.  It is certainly possible that she may

have developed a stress ulceration given the evident indication for chronic

anticoagulation, further investigation is reasonable.  We will plan for diagnostic

esophagogastroduodenoscopy tomorrow.  Please hold tube feeds at midnight, continue

to hold Lovenox for now, continue the PPI IV. 

Thank you for the consultation.  Please call anytime with questions or concerns.







Job ID:  [id number]

## 2020-05-25 LAB
ANION GAP SERPL CALC-SCNC: 11 MMOL/L (ref 10–20)
BASOPHILS # BLD AUTO: 0 THOU/UL (ref 0–0.2)
BASOPHILS NFR BLD AUTO: 0.2 % (ref 0–1)
BUN SERPL-MCNC: 29 MG/DL (ref 9.8–20.1)
CALCIUM SERPL-MCNC: 8.3 MG/DL (ref 7.8–10.44)
CHLORIDE SERPL-SCNC: 105 MMOL/L (ref 98–107)
CO2 SERPL-SCNC: 29 MMOL/L (ref 23–31)
CREAT CL PREDICTED SERPL C-G-VRATE: 87 ML/MIN (ref 70–130)
EOSINOPHIL # BLD AUTO: 0.1 THOU/UL (ref 0–0.7)
EOSINOPHIL NFR BLD AUTO: 0.9 % (ref 0–10)
GLUCOSE SERPL-MCNC: 177 MG/DL (ref 80–115)
HGB BLD-MCNC: 8.5 G/DL (ref 12–16)
LYMPHOCYTES # BLD: 3.1 THOU/UL (ref 1.2–3.4)
LYMPHOCYTES NFR BLD AUTO: 20.8 % (ref 21–51)
MCH RBC QN AUTO: 31.8 PG (ref 27–31)
MCV RBC AUTO: 101 FL (ref 78–98)
MONOCYTES # BLD AUTO: 1 THOU/UL (ref 0.11–0.59)
MONOCYTES NFR BLD AUTO: 6.9 % (ref 0–10)
NEUTROPHILS # BLD AUTO: 10.7 THOU/UL (ref 1.4–6.5)
NEUTROPHILS NFR BLD AUTO: 71.2 % (ref 42–75)
PLATELET # BLD AUTO: 265 THOU/UL (ref 130–400)
POTASSIUM SERPL-SCNC: 4.2 MMOL/L (ref 3.5–5.1)
RBC # BLD AUTO: 2.67 MILL/UL (ref 4.2–5.4)
SODIUM SERPL-SCNC: 141 MMOL/L (ref 136–145)
WBC # BLD AUTO: 15.1 THOU/UL (ref 4.8–10.8)

## 2020-05-25 RX ADMIN — NYSTATIN SCH: 100000 SUSPENSION ORAL at 12:13

## 2020-05-25 RX ADMIN — INSULIN HUMAN SCH: 100 INJECTION, SUSPENSION SUBCUTANEOUS at 21:45

## 2020-05-25 RX ADMIN — NYSTATIN SCH UNITS: 100000 SUSPENSION ORAL at 19:35

## 2020-05-25 RX ADMIN — POTASSIUM CHLORIDE AND SODIUM CHLORIDE SCH: 450; 150 INJECTION, SOLUTION INTRAVENOUS at 14:10

## 2020-05-25 RX ADMIN — NYSTATIN SCH UNITS: 100000 SUSPENSION ORAL at 12:06

## 2020-05-25 RX ADMIN — INSULIN LISPRO PRN UNIT: 100 INJECTION, SOLUTION INTRAVENOUS; SUBCUTANEOUS at 16:11

## 2020-05-25 RX ADMIN — Medication SCH ML: at 12:12

## 2020-05-25 RX ADMIN — INSULIN HUMAN SCH: 100 INJECTION, SUSPENSION SUBCUTANEOUS at 09:00

## 2020-05-25 RX ADMIN — NYSTATIN SCH UNITS: 100000 SUSPENSION ORAL at 21:17

## 2020-05-25 RX ADMIN — INSULIN LISPRO PRN UNIT: 100 INJECTION, SOLUTION INTRAVENOUS; SUBCUTANEOUS at 21:44

## 2020-05-25 RX ADMIN — Medication SCH ML: at 21:24

## 2020-05-25 RX ADMIN — NYSTATIN SCH UNITS: 100000 SUSPENSION ORAL at 12:13

## 2020-05-25 NOTE — OP
DATE OF PROCEDURE:  05/25/2020



PROCEDURE PERFORMED:  Esophagogastroduodenoscopy, diagnostic.



INDICATIONS:  

1. Melena, single episode 2 days ago.

2. Acute blood loss anemia.



MEDICATIONS:  See Anesthesia record.



FINDINGS:  After discussion of the risks, benefits, and alternatives of the

procedure, informed consent was obtained and witnessed.  Pre-endoscopic

cardiopulmonary examination was satisfactory.  Time-out was performed before

sedation was achieved.  Sedation was achieved with Anesthesia assistance in the

endoscopy unit.  A Pentax adult upper endoscope was placed into the oropharynx and

passed through the cricopharyngeus under direct visualization.  There was some

moderate erosive gastritis in the distal 5 cm of the esophagus with no active

bleeding.  The endoscope was advanced into the stomach.  Forward and retroflexed

views of the entire gastric mucosa were obtained.  There was some patchy erythema

throughout the gastric fundus, body, and antrum with no erosions or ulcerations

noted.  The endoscope was advanced through the pylorus and into the duodenal bulb.

In the duodenal bulb, there are 2 large ulcerations, one is in the superior aspect

of the bulb and the other is in the inferior aspect of the bulb.  Both of these

ulcerations are cratered, but clean based with no high-risk stigmata for rebleeding.

 There was no active bleeding.  No adherent clot.  The endoscope was advanced beyond

this area into the second portion of the duodenum, which appeared normal.  The upper

endoscope was then completely withdrawn and the patient allowed to recover.  The

patient tolerated the procedure well.  There were no immediate postprocedure

complications. 



IMPRESSION:  

1. Two large nonbleeding, clean-based ulcers in the duodenal bulb.

2. Patchy nonerosive gastritis.

3. Distal erosive esophagitis.



RECOMMENDATION:  

1. Check H pylori serology.  If positive, treat with triple therapy and confirm

eradication. 

2. Continue with the IV proton pump inhibitor every 12 hours for the duration of her

hospitalization.  On hospital discharge, transition to twice daily oral dosing of

proton pump inhibitor, and continue this for at least 2 months. 

3. Feeds can be resumed. 



GI will sign off.  Please call back if needed.







Job ID:  [id number]

## 2020-05-25 NOTE — PDOC.HOSPP
- Subjective


Encounter Date: 05/25/20


Subjective: 





Status post EGD.


Patient has no new complaints at this time.





- Objective


Vital Signs & Weight: 


 Vital Signs (12 hours)











  Temp Pulse Resp Pulse Ox


 


 05/25/20 10:57   91  16  99


 


 05/25/20 07:39     91 L


 


 05/25/20 07:30  98.8 F   


 


 05/25/20 07:09     93 L


 


 05/25/20 07:06   98  25 H  98


 


 05/25/20 03:46    20  97


 


 05/25/20 03:44  98.3 F   


 


 05/25/20 00:22  98.7 F   








 Weight











Admit Weight                   243 lb


 


Weight                         235 lb











 Most Recent Monitor Data











Heart Rate from ECG            96


 


NIBP                           169/70


 


NIBP BP-Mean                   103


 


Respiration from ECG           25


 


SpO2                           94














I&O: 


 











 05/24/20 05/25/20 05/26/20





 06:59 06:59 06:59


 


Intake Total 4116 1632 95


 


Output Total 3700 4025 


 


Balance 416 -5936 95











Result Diagrams: 


 05/25/20 04:50





 05/25/20 04:50


Additional Labs: 


 Accuchecks











  05/25/20 05/25/20 05/24/20





  08:16 03:41 23:35


 


POC Glucose  224 H  183 H  192 H














  05/24/20 05/24/20 05/24/20





  19:55 16:11 12:09


 


POC Glucose  344 H  372 H  305 H














Hospitalist ROS





- Medication


Medications: 


Active Medications











Generic Name Dose Route Start Last Admin





  Trade Name Freq  PRN Reason Stop Dose Admin


 


Albuterol/Ipratropium  3 ml  05/21/20 10:30  05/25/20 10:57





  Duoneb  EZPAP   3 ml





  [id number] ANNIE   Administration





     





     





     





     


 


Amlodipine Besylate  10 mg  05/19/20 09:00  05/24/20 12:08





  Norvasc  PO   10 mg





  DAILY ANNIE   Administration





     





     





     





     


 


Cefdinir  300 mg  05/24/20 21:00  05/24/20 20:40





  Omnicef  PO   300 mg





  BID ANNIE   Administration





     





     





     





     


 


Clonidine  0.2 mg  05/23/20 21:00  05/24/20 20:40





  Catapres  PO   0.2 mg





  TID ANNIE   Administration





     





     





     





     


 


Docusate Sodium  100 mg  05/19/20 09:00  05/24/20 12:20





  Colace  PO   Not Given





  DAILY ANNIE   





     





     





     





     


 


Hydralazine HCl  10 mg  05/19/20 09:12  05/24/20 13:14





  Apresoline  SLOW IVP   10 mg





  Q4H PRN   Administration





  SBP Greater Than 180   





     





     





     


 


Hydralazine HCl  25 mg  05/23/20 21:00  05/24/20 20:40





  Apresoline  PO   25 mg





  TID ANNIE   Administration





     





     





     





     


 


Potassium Chloride 40 meq/  100 mls @ 50 mls/hr  05/17/20 17:05  05/21/20 05:13





  Device  IVPB   100 mls





  ASDIR PRN   Administration





  FOR SERUM K+ 2.5 - 3.5   





     





     





     


 


Insulin Human Lispro  0 units  05/19/20 01:04  05/24/20 19:56





  Humalog  SC   11 unit





  .AGGRESSIVE SLIDING PRN   Administration





  AGGRESSIVE SLIDING SCALE   





     





  Protocol   





     


 


Insulin Human NPH  25 unit  05/21/20 21:00  05/24/20 19:57





  Humulin N  SC   25 unit





  BID ANNIE   Administration





     





     





     





     


 


Lisinopril  40 mg  05/19/20 09:00  05/24/20 10:27





  Zestril  PO   40 mg





  DAILY ANNIE   Administration





     





     





     





     


 


Nystatin  500,000 units  05/23/20 13:00  05/24/20 20:41





  Mycostatin  SSW   500,000 units





  QID ANNIE   Administration





     





     





     





     


 


Pantoprazole Sodium  40 mg  05/23/20 21:00  05/25/20 10:57





  Protonix  IVP   40 mg





  Q12HR ANNIE   Administration





     





     





     





     


 


Saccharomyces Boulardii  250 mg  05/20/20 09:00  05/24/20 10:27





  Florastor  PO   250 mg





  DAILY ANNIE   Administration





     





     





     





     


 


Sodium Chloride  10 ml  05/18/20 21:00  05/24/20 22:21





  Flush - Normal Saline  IVF   10 ml





  Q12HR ANNIE   Administration





     





     





     





     














Hosp A/P





- Plan





Severe Sepsis 2/2 to RLE Cellulitis


Acute respiratory failure with hypoxia/hypercapnia due to retained secretions ?

Aspiration Pneumonia - extubated 5/20


GI Bleed


Hypertension 


DM2


Swallow dys - on tube feeds


Hypernatremia - improving


Dementia


h/o CVA


Mild persistent Asthma


Depression


Chronic Anticoagulation


Morbid Obesity BMI 40.9


CKD 3





PLAN:


5/23:


On Nelson water protocol


Cont IV Cefepime


Cont 25 units BID NPH with sliding scale


Reduce Clonidine and Hydralazine


DC NTG patch


Cont Amlodipine/Lisinopril


Cont tube feeds


Cont other meds as above


Cont IMCU monitoring





Update;


Pt had dark BM - hemodynamically stable. Will monitor H/H. Hold Lovenox. Add IV 

PPI. Type and screen.





5/24:


The patient has no new complaints today.


No further episodes of melena today.


She is scheduled for EGD in the morning.


Antibiotics switched to oral cefdinir.


Continue tube feeding until midnight.


Lovenox on hold.





5/25:


Status post EGD showing erosive esophagitis with nonerosive gastritis and 2 

duodenal ulcers with no active bleeding.


Biopsies for bacterial myofibrillar has been obtained.  Protonix has been 

initiated.


Plan for modified barium swallow evaluation tomorrow.


The patient pulled out her NG tube yesterday and possible aspiration was noted.

  We will check chest x-ray.


She is still requiring oxygen.


Remains on oral antibiotics.


No signs of sepsis.

## 2020-05-25 NOTE — RAD
Modified barium swallow



HISTORY: Dysphagia. Feeding difficulties.



FINDINGS: Exam was performed in conjunction with speech pathology with multiple consistencies. Video 
review is available and demonstrates very poor oral control of bolus with early spill of all

consistencies. Flash penetration with all consistencies. Silent aspiration with thin liquid.



Large amount of residual within the valleculae and piriform sinuses with very limited clearance upon 
secondary swallowing.



No evidence of esophageal obstruction. The esophagus below the level of the hypopharynx was not evalu
ated.



Please see separate detailed report from speech pathology.



Reported By: MAVIS Arshad 

Electronically Signed:  5/25/2020 2:27 PM

## 2020-05-25 NOTE — PRG
DATE OF SERVICE:  05/25/2020



SUBJECTIVE:  The patient is doing fairly well all things considered.



OBJECTIVE:  VITAL SIGNS:  Temperature 98.7, pulse 95, blood pressure 160/81. 

HEENT:  Unremarkable. 

NECK:  No adenopathy or JVD. 

LUNGS:  Coarse breath sounds anteriorly. 

CARDIAC:  S1 and S2 regular. 

ABDOMEN:  Soft. 

EXTREMITIES: Edematous legs.



LABORATORY DATA:  White cell count 15, hematocrit 26.9, platelet count 265.  Sodium

141, potassium 4.2, BUN 29, creatinine 1.1, glucose 177. 



ASSESSMENT:  

1. Aspiration pneumonia.

2. Severe deconditioning.



PLAN:  She should be stable enough transfer to medical floor.  She is on oral

antibiotics. 







Job ID:  [id number]

## 2020-05-25 NOTE — RAD
CHEST 1 VIEW:

 

HISTORY: 

Hypoxia.

 

COMPARISON: 

5/23/2020.

 

FINDINGS: 

Very mild increased markings in the infrahilar regions but showing definite improvement from the prio
r study.  No new process.

 

IMPRESSION: 

Minimal increased markings in the infrahilar regions but overall improved from prior exam.  No signif
icant new process.

 

POS: [facility]

## 2020-05-26 LAB
ANION GAP SERPL CALC-SCNC: 12 MMOL/L (ref 10–20)
BUN SERPL-MCNC: 25 MG/DL (ref 9.8–20.1)
CALCIUM SERPL-MCNC: 8 MG/DL (ref 7.8–10.44)
CHLORIDE SERPL-SCNC: 108 MMOL/L (ref 98–107)
CO2 SERPL-SCNC: 27 MMOL/L (ref 23–31)
CREAT CL PREDICTED SERPL C-G-VRATE: 88 ML/MIN (ref 70–130)
GLUCOSE SERPL-MCNC: 191 MG/DL (ref 80–115)
HGB BLD-MCNC: 8.2 G/DL (ref 12–16)
MCH RBC QN AUTO: 31 PG (ref 27–31)
MCV RBC AUTO: 101 FL (ref 78–98)
MDIFF COMPLETE?: YES
PLATELET # BLD AUTO: 279 THOU/UL (ref 130–400)
POTASSIUM SERPL-SCNC: 4.2 MMOL/L (ref 3.5–5.1)
RBC # BLD AUTO: 2.64 MILL/UL (ref 4.2–5.4)
SODIUM SERPL-SCNC: 143 MMOL/L (ref 136–145)
WBC # BLD AUTO: 12.2 THOU/UL (ref 4.8–10.8)

## 2020-05-26 RX ADMIN — NYSTATIN SCH UNITS: 100000 SUSPENSION ORAL at 08:58

## 2020-05-26 RX ADMIN — INSULIN LISPRO PRN UNIT: 100 INJECTION, SOLUTION INTRAVENOUS; SUBCUTANEOUS at 17:06

## 2020-05-26 RX ADMIN — Medication SCH ML: at 09:00

## 2020-05-26 RX ADMIN — INSULIN HUMAN SCH: 100 INJECTION, SUSPENSION SUBCUTANEOUS at 08:59

## 2020-05-26 RX ADMIN — INSULIN HUMAN SCH UNIT: 100 INJECTION, SUSPENSION SUBCUTANEOUS at 17:10

## 2020-05-26 RX ADMIN — NYSTATIN SCH UNITS: 100000 SUSPENSION ORAL at 13:09

## 2020-05-26 RX ADMIN — Medication SCH ML: at 20:56

## 2020-05-26 RX ADMIN — NYSTATIN SCH UNITS: 100000 SUSPENSION ORAL at 20:56

## 2020-05-26 RX ADMIN — INSULIN LISPRO PRN UNIT: 100 INJECTION, SOLUTION INTRAVENOUS; SUBCUTANEOUS at 13:11

## 2020-05-26 RX ADMIN — INSULIN LISPRO PRN UNIT: 100 INJECTION, SOLUTION INTRAVENOUS; SUBCUTANEOUS at 20:55

## 2020-05-26 RX ADMIN — NYSTATIN SCH UNITS: 100000 SUSPENSION ORAL at 15:43

## 2020-05-26 NOTE — PDOC.HOSPP
- Subjective


Encounter Date: 05/26/20


Subjective: 


Participating with PT.


No new complains.





- Objective


Vital Signs & Weight: 


 Vital Signs (12 hours)











  Temp Pulse Pulse Pulse Resp BP BP


 


 05/26/20 18:20   110 H     185/89 H 


 


 05/26/20 15:43       186/99 H 


 


 05/26/20 15:42   112 H     186/99 H 


 


 05/26/20 15:00  99.3 F      


 


 05/26/20 14:41   83    15  


 


 05/26/20 11:10  97.8 F      


 


 05/26/20 11:03   84    16  


 


 05/26/20 10:46    92  94    161/76 H


 


 05/26/20 09:59    90  89    161/76 H


 


 05/26/20 08:58   84     168/76 H 


 


 05/26/20 08:57   84     168/76 H 


 


 05/26/20 08:56       168/76 H 


 


 05/26/20 07:58       


 


 05/26/20 07:52   84    22 H  


 


 05/26/20 07:51       


 


 05/26/20 07:18  98.6 F      














  BP Pulse Ox Pulse Ox Pulse Ox


 


 05/26/20 18:20    


 


 05/26/20 15:43    


 


 05/26/20 15:42    


 


 05/26/20 15:00    


 


 05/26/20 14:41   95  


 


 05/26/20 11:10    


 


 05/26/20 11:03   95  


 


 05/26/20 10:46  165/78 H   95  96


 


 05/26/20 09:59  177/75 H   


 


 05/26/20 08:58    


 


 05/26/20 08:57    


 


 05/26/20 08:56    


 


 05/26/20 07:58   98  


 


 05/26/20 07:52   98  


 


 05/26/20 07:51   98  


 


 05/26/20 07:18    








 Weight











Admit Weight                   243 lb


 


Weight                         231 lb 3 oz











 Most Recent Monitor Data











Heart Rate from ECG            98


 


NIBP                           173/71


 


NIBP BP-Mean                   105


 


Respiration from ECG           27


 


SpO2                           94














I&O: 


 











 05/25/20 05/26/20 05/27/20





 06:59 06:59 06:59


 


Intake Total 1632 1853 


 


Output Total 5813 3730 


 


Balance -2393 -22 











Result Diagrams: 


 05/26/20 04:20





 05/26/20 04:29


Additional Labs: 


 Accuchecks











  05/26/20 05/26/20 05/26/20





  16:54 12:25 08:13


 


POC Glucose  415 H  312 H  205 H














  05/26/20 05/26/20 05/25/20





  04:24 00:12 20:13


 


POC Glucose  191 H  251 H  275 H














Hospitalist ROS





- Medication


Medications: 


Active Medications











Generic Name Dose Route Start Last Admin





  Trade Name Freq  PRN Reason Stop Dose Admin


 


Albuterol/Ipratropium  3 ml  05/21/20 10:30  05/26/20 14:41





  Duoneb  EZPAP   3 ml





  [id number] ANNIE   Administration





     





     





     





     


 


Amlodipine Besylate  10 mg  05/19/20 09:00  05/26/20 08:57





  Norvasc  PO   10 mg





  DAILY ANNIE   Administration





     





     





     





     


 


Cefdinir  300 mg  05/24/20 21:00  05/26/20 08:56





  Omnicef  PO   300 mg





  BID ANNIE   Administration





     





     





     





     


 


Clonidine  0.2 mg  05/23/20 21:00  05/26/20 15:43





  Catapres  PO   0.2 mg





  TID ANNIE   Administration





     





     





     





     


 


Docusate Sodium  100 mg  05/19/20 09:00  05/26/20 08:57





  Colace  PO   100 mg





  DAILY ANNIE   Administration





     





     





     





     


 


Hydralazine HCl  10 mg  05/19/20 09:12  05/26/20 18:20





  Apresoline  SLOW IVP   10 mg





  Q4H PRN   Administration





  SBP Greater Than 180   





     





     





     


 


Hydralazine HCl  25 mg  05/23/20 21:00  05/26/20 15:42





  Apresoline  PO   25 mg





  TID ANNIE   Administration





     





     





     





     


 


Potassium Chloride 40 meq/  100 mls @ 50 mls/hr  05/17/20 17:05  05/21/20 05:13





  Device  IVPB   100 mls





  ASDIR PRN   Administration





  FOR SERUM K+ 2.5 - 3.5   





     





     





     


 


Sodium Chloride  1,000 mls @ 75 mls/hr  05/25/20 11:15  05/26/20 13:10





  Normal Saline 0.9%  IV   1,000 mls





  .[id number] ANNIE   Administration





     





     





     





     


 


Insulin Human Lispro  0 units  05/19/20 01:04  05/26/20 17:06





  Humalog  SC   13 unit





  .AGGRESSIVE SLIDING PRN   Administration





  AGGRESSIVE SLIDING SCALE   





     





  Protocol   





     


 


Insulin Human NPH  25 unit  05/21/20 21:00  05/26/20 17:10





  Humulin N  SC   25 unit





  BID ANNIE   Administration





     





     





     





     


 


Lisinopril  40 mg  05/19/20 09:00  05/26/20 08:56





  Zestril  PO   40 mg





  DAILY ANNIE   Administration





     





     





     





     


 


Nystatin  500,000 units  05/23/20 13:00  05/26/20 15:43





  Mycostatin  SSW   500,000 units





  QID ANNIE   Administration





     





     





     





     


 


Pantoprazole Sodium  40 mg  05/23/20 21:00  05/26/20 08:59





  Protonix  IVP   40 mg





  Q12HR ANNIE   Administration





     





     





     





     


 


Prednisone  40 mg  05/25/20 08:00  05/26/20 08:59





  Prednisone  PO   40 mg





  QAM-WM ANNIE   Administration





     





     





     





     


 


Saccharomyces Boulardii  250 mg  05/20/20 09:00  05/26/20 08:57





  Florastor  PO   250 mg





  DAILY ANNIE   Administration





     





     





     





     


 


Sodium Chloride  10 ml  05/18/20 21:00  05/26/20 09:00





  Flush - Normal Saline  IVF   10 ml





  Q12HR ANNIE   Administration





     





     





     





     














- Exam


General Appearance: awake alert


ENT: normocephalic atraumatic


Neck: supple


Heart: RRR, no murmur, no gallops, no rubs, normal peripheral pulses


Respiratory: CTAB, no wheezes, no rales, no ronchi, normal chest expansion


Gastrointestinal: soft





Hosp A/P





- Plan





Severe Sepsis 2/2 to RLE Cellulitis


Acute respiratory failure with hypoxia/hypercapnia due to retained secretions ?

Aspiration Pneumonia - extubated 5/20


GI Bleed


Hypertension 


DM2


Swallow dys - on tube feeds


Hypernatremia - improving


Dementia


h/o CVA


Mild persistent Asthma


Depression


Chronic Anticoagulation


Morbid Obesity BMI 40.9


CKD 3





PLAN:





Status post EGD showing erosive esophagitis with nonerosive gastritis and 2 

duodenal ulcers with no active bleeding.


Biopsies for bacterial myofibrillar has been obtained.  Protonix has been 

initiated.


The patient is high risk for aspiration per modified barium swallow study.  The 

patient does not wish for a PEG tube and has decided to pursue diet with risk.  

Since the patient is confused intermittently, I contacted her daughter who 

confirmed patient's wishes.  We will pursue modified diabetic diet as tolerated.


Continue antibiotics for aspiration pneumonia.  The patient can likely be 

transitioned to the medical floor.

## 2020-05-26 NOTE — PRG
DATE OF SERVICE:  05/26/2020



SUBJECTIVE:  This morning, she is awake, alert, and responsive. She is better.



OBJECTIVE:  VITAL SIGNS: Pulse 84, blood pressure 161/67, sats are 98% on 3 L,

temperature 98. 

GENERAL:  Awake. 

CHEST: Decreased breath sounds. No wheezing. 

CARDIAC: Normal S1, S2.  No gallops. 

ABDOMEN: Soft.



LABORATORY DATA:  White count 12,000, H and H 8 and 26.  Lytes are normal.  She had

a modified speech study done which was consistent with aspiration. 



IMPRESSION AND PLAN:  

1. Cerebrovascular accident.

2. Morbid obesity.

3. Severe deconditioning.

4. Ongoing dysphagia. 



Family to decide about a PEG versus comfort care.







Job ID:  [id number]

## 2020-05-26 NOTE — PDOC.PALCO
Palliative Care Consult





- Consult Details


Requesting Physician: Marisol Loredo and Fang


Reason for Consult: goals of care, family support, complex decision-making





- Pertinent HPI





64 year old female who resides at a nursing home facility secondary to 

inability to independently perform ALD's post CVA. Patient has two children and 

her son has deferred decisions to his sister Génesis. Patient was taken via EMS 

to [facility] for management of cellulitis to right lower extremity, 

and experienced altered mental status and required mechanical intubation to 

sustain safe airway. Secondary to need for higher level of care was transferred 

to [facility] CCU. Subsequently extubated and Dobhoff was placed. Dobhoff 

unintentionally removed. 





- Social History


Smoking Status: Former smoker


Smoking: quit greater than 1 year


Alcohol Use: none


Drug Use History: none


Living Situation: nursing home resident





- Medications


MAR Reviewed: Yes





- Allergies


Allergies/Adverse Reactions: 


 Allergies











Allergy/AdvReac Type Severity Reaction Status Date / Time


 


codeine Allergy   Verified 05/14/20 17:31














- Subjective





Awake, pleasant and able to answer yes/no questions. Not able to recall her 

daughters name or that her daughter has three children. Poor memory recall. 

Unable to determine orientation beyond self.  Poor Historian for ROS secondary 

to confusion.





- ROS


Constitutional: weakness


Eyes: other (Denies visual changes)


ENT: alteration in dentition, other (Denies throat irritation, congestion)


Respiratory: other (Denies cough)


Cardiology: other (Denies chest pain or palpitations)


Gastrointestinal: other (denies vomiting or nausea)


Genitourinary: other


Neurological: numbness





- Objective


Vital Signs: 


 Vital Signs - Most Recent











Temp Pulse Resp BP Pulse Ox


 


 97.8 F   84   16   161/76 H  95 


 


 05/26/20 11:10  05/26/20 11:03  05/26/20 11:03  05/26/20 09:59  05/26/20 11:03











Palliative Performance Scale: 30





- Physical Exam


Constitutional: confusion, ill appearing


HEENT: moist MMs, sclera anicteric, poor dentition


Respiratory: unlabored breathing, diminished lung sound


Cardiovascular: RRR


Gastrointestinal: soft, non-tender


Genitourinary: mccormick catheter


Musculoskeletal: edema present, diffuse muscle atrophy


Deviation from normal: right sided weakness


Skin: fragile (Please refer to wound photo, erythema to right lower extremity)


Deviation from normal: Oriented to self





- Problem List


(1) Dysphagia


Code(s): R13.10 - DYSPHAGIA, UNSPECIFIED   Current Visit: Yes   Status: Acute   





(2) Declining functional status


Code(s): R53.81 - OTHER MALAISE   Current Visit: Yes   Status: Acute   





(3) Palliative care encounter


Code(s): Z51.5 - ENCOUNTER FOR PALLIATIVE CARE   Current Visit: Yes   Status: 

Acute   





(4) Acute respiratory failure with hypoxia


Code(s): J96.01 - ACUTE RESPIRATORY FAILURE WITH HYPOXIA   Current Visit: Yes   

Status: Acute   





(5) Cellulitis


Code(s): L03.90 - CELLULITIS, UNSPECIFIED   Current Visit: Yes   Status: Acute 

  





(6) Diabetes mellitus


Code(s): E11.9 - TYPE 2 DIABETES MELLITUS WITHOUT COMPLICATIONS   Current Visit

: Yes   Status: Acute   





(7) History of CVA (cerebrovascular accident)


Code(s): Z86.73 - PRSNL HX OF TIA (TIA), AND CEREB INFRC W/O RESID DEFICITS   

Current Visit: Yes   Status: Acute   





(8) Hypertension


Code(s): I10 - ESSENTIAL (PRIMARY) HYPERTENSION   Current Visit: Yes   Status: 

Acute   





- Plan/Recommendations


Plan:


Communicated with patient, she has poor memory recall.





Daughter relayed that her mother was on a mechanical soft diet at the nursing 

home prior to presentation to [facility], and would also eat "what ever she 

desired". Discussed aspiration risk related to dysphagia. Discussed diet with 

risk, she confirmed that her mother would not want to have what she can eat 

limited.





Communicated with Speech and Dr Headley. Speech confirmed aspiration risk with 

suggested diet options.  Dr Wilson communicated with patient daughter, 

further educating in relation to diet with risk and comfort feeds. Daughter 

would like to proceed with diet with risk, advancing diet safely allowing for 

optimal baseline for patient.  States he mother would not desire another 

placement of NG at this time or PEG.





Emotional support and therapeutic listening. 




















[75] minutes spent on this encounter with >50% of the time in counseling and 

coordination of care.





Thank you for this very appropriate consult.

## 2020-05-27 VITALS — TEMPERATURE: 97.5 F

## 2020-05-27 VITALS — SYSTOLIC BLOOD PRESSURE: 179 MMHG | DIASTOLIC BLOOD PRESSURE: 102 MMHG

## 2020-05-27 LAB
ANION GAP SERPL CALC-SCNC: 12 MMOL/L (ref 10–20)
BUN SERPL-MCNC: 22 MG/DL (ref 9.8–20.1)
CALCIUM SERPL-MCNC: 8.1 MG/DL (ref 7.8–10.44)
CHLORIDE SERPL-SCNC: 109 MMOL/L (ref 98–107)
CO2 SERPL-SCNC: 25 MMOL/L (ref 23–31)
CREAT CL PREDICTED SERPL C-G-VRATE: 97 ML/MIN (ref 70–130)
GLUCOSE SERPL-MCNC: 234 MG/DL (ref 80–115)
HGB BLD-MCNC: 8.5 G/DL (ref 12–16)
MCH RBC QN AUTO: 31.2 PG (ref 27–31)
MCV RBC AUTO: 100 FL (ref 78–98)
MDIFF COMPLETE?: YES
PLATELET # BLD AUTO: 363 THOU/UL (ref 130–400)
POTASSIUM SERPL-SCNC: 3.6 MMOL/L (ref 3.5–5.1)
RBC # BLD AUTO: 2.73 MILL/UL (ref 4.2–5.4)
SODIUM SERPL-SCNC: 142 MMOL/L (ref 136–145)
WBC # BLD AUTO: 10.5 THOU/UL (ref 4.8–10.8)

## 2020-05-27 RX ADMIN — Medication SCH ML: at 08:50

## 2020-05-27 RX ADMIN — NYSTATIN SCH UNITS: 100000 SUSPENSION ORAL at 15:53

## 2020-05-27 RX ADMIN — INSULIN HUMAN SCH UNIT: 100 INJECTION, SUSPENSION SUBCUTANEOUS at 08:50

## 2020-05-27 RX ADMIN — NYSTATIN SCH UNITS: 100000 SUSPENSION ORAL at 08:50

## 2020-05-27 RX ADMIN — INSULIN LISPRO PRN UNIT: 100 INJECTION, SOLUTION INTRAVENOUS; SUBCUTANEOUS at 04:20

## 2020-05-27 RX ADMIN — NYSTATIN SCH UNITS: 100000 SUSPENSION ORAL at 12:17

## 2020-05-27 NOTE — PRG
DATE OF SERVICE:  05/27/2020



SUBJECTIVE:  This morning, she is awake, alert, responsive.  She is better.  She had

a speech evaluation yesterday, which showed that the patient __________ as per their

recommendation. 



OBJECTIVE:  VITAL SIGNS:  Temperature 99, blood pressure is 102/91, pulse 94,

respiratory rate 18. 

CHEST:  No wheezing or crackles. 

CARDIAC:  Normal S1, S2.  No gallops. 

ABDOMEN:  No masses.



LABORATORY DATA:  Otherwise unremarkable.



ASSESSMENT:  

1. Cerebrovascular accident.

2. Dysphagia.

3. Aspiration.

4. Morbid obesity.

5. Severe deconditioning.

6. Respiratory failure.



PLAN:  In the process of trying to get back to the nursing home 



Her long-term prognosis is grave. 



We will follow.







Job ID:  [id number]

## 2020-05-27 NOTE — PDOC.PALPN
Palliative Progress Note





- Subjective





Tearful, daughter just left from bedside. Ms Núñez is limited in speech to one-

two words and appears to have poor memory recall. Not a reliable source for ROS 





- Objective


Vital Signs: 


 Vital Signs - Most Recent











Temp Pulse Resp BP Pulse Ox


 


 99.0 F   103 H  16   205/105 H  94 L


 


 05/27/20 07:15  05/27/20 08:49  05/27/20 08:17  05/27/20 08:49  05/27/20 02:10














- Physical Exam


Constitutional: confusion, ill appearing


HEENT: moist MMs, poor dentition


Respiratory: unlabored breathing, diminished lung sound


Gastrointestinal: soft, non-tender, positive bowel sounds


Genitourinary: mccormick catheter


Musculoskeletal: pulses present, edema present, diffuse muscle atrophy


Neurology: hemiplegia


Skin: cap refill <2 seconds, fragile


Deviation from normal: Alert and oriented to self. Tearful





- Assessment


(1) Dysphagia


Code(s): R13.10 - DYSPHAGIA, UNSPECIFIED   Current Visit: Yes   Status: Acute   





(2) Declining functional status


Code(s): R53.81 - OTHER MALAISE   Current Visit: Yes   Status: Acute   





(3) Palliative care encounter


Code(s): Z51.5 - ENCOUNTER FOR PALLIATIVE CARE   Current Visit: Yes   Status: 

Acute   





(4) Acute respiratory failure with hypoxia


Code(s): J96.01 - ACUTE RESPIRATORY FAILURE WITH HYPOXIA   Current Visit: Yes   

Status: Acute   





(5) Cellulitis


Code(s): L03.90 - CELLULITIS, UNSPECIFIED   Current Visit: Yes   Status: Acute 

  





(6) Diabetes mellitus


Code(s): E11.9 - TYPE 2 DIABETES MELLITUS WITHOUT COMPLICATIONS   Current Visit

: Yes   Status: Acute   





(7) History of CVA (cerebrovascular accident)


Code(s): Z86.73 - PRSNL HX OF TIA (TIA), AND CEREB INFRC W/O RESID DEFICITS   

Current Visit: Yes   Status: Acute   





(8) Hypertension


Code(s): I10 - ESSENTIAL (PRIMARY) HYPERTENSION   Current Visit: Yes   Status: 

Acute   





- Plan


Plan:


Daughter was at bedside and met with Palliative Care RN Follow up visit with 

patient. Concern is patient full medical capacity to make decisions. May 

revisit discharge plan with overlay of hospice in the nursing home setting 

secondary to decision for diet with risk and no PEG/NG and high risk for 

recurrent infection. Will also revisit DNAR and OOHDNAR.





Emotional support and therapeutic listening with patient. 


Communicated with spiritual care for further emotional and spiritual support. 


Please also refer to Palliative Care notes in note section. 

















[35] minutes spent on this encounter with >50% of the time in counseling and 

coordination of care.














- ROS


Constitutional: alert, weakness


ENT: other (Denies difficulity, documented dysphagia)


Respiratory: other (Denies cough or consestion. However appears to have bronch 

secretions)


Gastrointestinal: other (Denies nausea, vomiting) none

## 2020-05-28 LAB
H PYLORI IGA SER-ACNC: (no result) UNITS (ref 0–8.9)
H PYLORI IGG SER IA-ACNC: 0.3 (ref 0–0.79)
H PYLORI IGM SER-ACNC: (no result) UNITS (ref 0–8.9)

## 2020-05-28 NOTE — DIS
DATE OF ADMISSION:  05/17/2020



DATE OF DISCHARGE:  05/27/2020



DISCHARGE DIAGNOSES:  

1. Severe sepsis secondary to right lower extremity cellulitis.

2. Acute respiratory failure with hypoxia and hypercarbia.

3. Aspiration pneumonia.

4. Gastrointestinal bleeding.

5. Duodenal ulcer.

6. Erosive esophagitis.

7. Hypertension.

8. Diabetes mellitus, type 2.

9. Dysphagia.

10. Hypernatremia.

11. Dementia.

12. Asthma.

13. Depression.

14. Morbid obesity.



DISCHARGE MEDICATIONS:  

1. Amlodipine 10 mg orally daily.

2. Clonidine 0.2 mg orally 3 times a day.

3. Colace 100 mg orally daily as needed for constipation.

4. Lisinopril 20 mg orally daily.

5. Atorvastatin 40 mg orally nightly.

6. Omnicef 300 mg orally twice daily.

7. Donepezil 10 mg orally nightly.

8. Ferrous sulfate 325 mg orally daily.

9. Lasix 40 mg orally daily.

10. Glimepiride 2 mg orally twice daily.

11. Insulin glargine 26 units subcutaneously at bedtime.

12. Metformin 500 mg orally twice daily.

13. Potassium chloride 10 mEq orally daily.

14. Tramadol 50 mg orally twice daily as needed for pain.

15. Trazodone 75 mg orally nightly.

16. Warfarin 5 mg orally daily on Monday, Tuesday, Wednesday, and Thursday and 2 mg

on Friday, Saturday, and Sunday. 

17. Protonix 40 mg orally twice daily.



HISTORY OF PRESENT ILLNESS AND HOSPITAL COURSE:  The patient is a 64-year-old

female, nursing home resident, with history of CVA with residual deficits affecting

both cognition, speech, swallowing, and ambulation.  Her family stated that she has

not been in contact with them since the COVID-19 restrictions were put in place.

The patient was transferred to the hospital from nursing home due to cellulitis of

her right lower extremity.  A COVID test was performed at that time, which was

reported to be negative.  The patient's symptoms progressed despite antibiotics and

were now including respiratory distress.  She was placed on BiPAP and was

subsequently intubated.  This portion of management was done at the [facility].  The patient was subsequently transferred to our hospital for further

management.  The patient was placed on broad-spectrum IV antibiotics and

corticosteroids in addition to nebulizer treatments and was managed in the intensive

care unit under the care of Pulmonology Service.  Her condition gradually improved,

and she was successfully extubated.  The patient was not able to swallow safely, and

feeding was continued using an NG tube.  X-rays revealed infiltrates suggestive of

pneumonia, likely due to aspiration.  The patient's condition continued to gradually

improve including her mental status.  Her post extubation period was complicated by

an episode of melena.  GI was consulted, and EGD was performed showing erosive

esophagitis in the lower esophageal portion in addition to duodenal ulcers that were

nonbleeding.  NG tube was discontinued, and the patient was placed on IV Protonix,

which subsequently was converted to oral form.  The patient was subsequently

evaluated by Speech Therapy and was deemed to be high risk for aspiration.  After

discussion with Palliative Care Team, the patient and her family decided to pursue

diet with risk of aspiration.  Code status was also discussed, and the patient wants

to remain full code at this time.  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

was on warfarin prior to presentation.  Warfarin was held during her hospital stay

due to GI bleeding.  We recommend to hold warfarin for 1 week before restarting it. 







Job ID:  [id number]

## 2020-08-26 ENCOUNTER — HOSPITAL ENCOUNTER (INPATIENT)
Dept: HOSPITAL 92 - ERS | Age: 65
LOS: 7 days | Discharge: NURSING, REHAB OR LONG-TERM CARE | DRG: 607 | End: 2020-09-02
Attending: INTERNAL MEDICINE | Admitting: INTERNAL MEDICINE
Payer: MEDICARE

## 2020-08-26 VITALS — BODY MASS INDEX: 33.1 KG/M2

## 2020-08-26 DIAGNOSIS — Z79.84: ICD-10-CM

## 2020-08-26 DIAGNOSIS — Z88.6: ICD-10-CM

## 2020-08-26 DIAGNOSIS — Z79.01: ICD-10-CM

## 2020-08-26 DIAGNOSIS — I89.0: Primary | ICD-10-CM

## 2020-08-26 DIAGNOSIS — I27.82: ICD-10-CM

## 2020-08-26 DIAGNOSIS — E11.22: ICD-10-CM

## 2020-08-26 DIAGNOSIS — I12.9: ICD-10-CM

## 2020-08-26 DIAGNOSIS — Z20.828: ICD-10-CM

## 2020-08-26 DIAGNOSIS — N17.9: ICD-10-CM

## 2020-08-26 DIAGNOSIS — I69.351: ICD-10-CM

## 2020-08-26 DIAGNOSIS — R79.89: ICD-10-CM

## 2020-08-26 DIAGNOSIS — E78.00: ICD-10-CM

## 2020-08-26 DIAGNOSIS — N18.3: ICD-10-CM

## 2020-08-26 DIAGNOSIS — D50.0: ICD-10-CM

## 2020-08-26 DIAGNOSIS — Z79.899: ICD-10-CM

## 2020-08-26 LAB
ALBUMIN SERPL BCG-MCNC: 3.4 G/DL (ref 3.4–4.8)
ALP SERPL-CCNC: 114 U/L (ref 40–110)
ALT SERPL W P-5'-P-CCNC: 15 U/L (ref 8–55)
ANION GAP SERPL CALC-SCNC: 16 MMOL/L (ref 10–20)
APTT PPP: 24.7 SEC (ref 22.9–36.1)
AST SERPL-CCNC: 23 U/L (ref 5–34)
BASOPHILS # BLD AUTO: 0 THOU/UL (ref 0–0.2)
BASOPHILS NFR BLD AUTO: 0.4 % (ref 0–1)
BILIRUB SERPL-MCNC: 0.2 MG/DL (ref 0.2–1.2)
BUN SERPL-MCNC: 13 MG/DL (ref 9.8–20.1)
CALCIUM SERPL-MCNC: 8.9 MG/DL (ref 7.8–10.44)
CHLORIDE SERPL-SCNC: 99 MMOL/L (ref 98–107)
CK SERPL-CCNC: 25 U/L (ref 29–168)
CO2 SERPL-SCNC: 30 MMOL/L (ref 23–31)
CREAT CL PREDICTED SERPL C-G-VRATE: 0 ML/MIN (ref 70–130)
D DIMER PPP FEU-MCNC: 6.57 *MCG/ML (ref 0.27–0.43)
EOSINOPHIL # BLD AUTO: 0.2 THOU/UL (ref 0–0.7)
EOSINOPHIL NFR BLD AUTO: 1.4 % (ref 0–10)
GLOBULIN SER CALC-MCNC: 3 G/DL (ref 2.4–3.5)
GLUCOSE SERPL-MCNC: 163 MG/DL (ref 80–115)
HGB BLD-MCNC: 10.5 G/DL (ref 12–16)
INR PPP: 0.8
LYMPHOCYTES # BLD: 3.8 THOU/UL (ref 1.2–3.4)
LYMPHOCYTES NFR BLD AUTO: 31.9 % (ref 21–51)
MCH RBC QN AUTO: 29.5 PG (ref 27–31)
MCV RBC AUTO: 93.8 FL (ref 78–98)
MONOCYTES # BLD AUTO: 0.8 THOU/UL (ref 0.11–0.59)
MONOCYTES NFR BLD AUTO: 7 % (ref 0–10)
NEUTROPHILS # BLD AUTO: 7 THOU/UL (ref 1.4–6.5)
NEUTROPHILS NFR BLD AUTO: 59.3 % (ref 42–75)
PLATELET # BLD AUTO: 444 THOU/UL (ref 130–400)
POTASSIUM SERPL-SCNC: 3.8 MMOL/L (ref 3.5–5.1)
PROT UR STRIP.AUTO-MCNC: 20 MG/DL
PROTHROMBIN TIME: 11.3 SEC (ref 12–14.7)
RBC # BLD AUTO: 3.57 MILL/UL (ref 4.2–5.4)
RBC UR QL AUTO: (no result) HPF (ref 0–3)
SODIUM SERPL-SCNC: 141 MMOL/L (ref 136–145)
SP GR UR STRIP: 1.01 (ref 1–1.04)
WBC # BLD AUTO: 11.8 THOU/UL (ref 4.8–10.8)
WBC UR QL AUTO: (no result) HPF (ref 0–3)

## 2020-08-26 PROCEDURE — 36416 COLLJ CAPILLARY BLOOD SPEC: CPT

## 2020-08-26 PROCEDURE — 84443 ASSAY THYROID STIM HORMONE: CPT

## 2020-08-26 PROCEDURE — 96365 THER/PROPH/DIAG IV INF INIT: CPT

## 2020-08-26 PROCEDURE — 96375 TX/PRO/DX INJ NEW DRUG ADDON: CPT

## 2020-08-26 PROCEDURE — 81003 URINALYSIS AUTO W/O SCOPE: CPT

## 2020-08-26 PROCEDURE — 96372 THER/PROPH/DIAG INJ SC/IM: CPT

## 2020-08-26 PROCEDURE — U0003 INFECTIOUS AGENT DETECTION BY NUCLEIC ACID (DNA OR RNA); SEVERE ACUTE RESPIRATORY SYNDROME CORONAVIRUS 2 (SARS-COV-2) (CORONAVIRUS DISEASE [COVID-19]), AMPLIFIED PROBE TECHNIQUE, MAKING USE OF HIGH THROUGHPUT TECHNOLOGIES AS DESCRIBED BY CMS-2020-01-R: HCPCS

## 2020-08-26 PROCEDURE — 85025 COMPLETE CBC W/AUTO DIFF WBC: CPT

## 2020-08-26 PROCEDURE — 85610 PROTHROMBIN TIME: CPT

## 2020-08-26 PROCEDURE — 87086 URINE CULTURE/COLONY COUNT: CPT

## 2020-08-26 PROCEDURE — 36556 INSERT NON-TUNNEL CV CATH: CPT

## 2020-08-26 PROCEDURE — 83605 ASSAY OF LACTIC ACID: CPT

## 2020-08-26 PROCEDURE — 80048 BASIC METABOLIC PNL TOTAL CA: CPT

## 2020-08-26 PROCEDURE — 81015 MICROSCOPIC EXAM OF URINE: CPT

## 2020-08-26 PROCEDURE — 87635 SARS-COV-2 COVID-19 AMP PRB: CPT

## 2020-08-26 PROCEDURE — 85379 FIBRIN DEGRADATION QUANT: CPT

## 2020-08-26 PROCEDURE — 84484 ASSAY OF TROPONIN QUANT: CPT

## 2020-08-26 PROCEDURE — 80053 COMPREHEN METABOLIC PANEL: CPT

## 2020-08-26 PROCEDURE — 83735 ASSAY OF MAGNESIUM: CPT

## 2020-08-26 PROCEDURE — 71045 X-RAY EXAM CHEST 1 VIEW: CPT

## 2020-08-26 PROCEDURE — 80202 ASSAY OF VANCOMYCIN: CPT

## 2020-08-26 PROCEDURE — 96376 TX/PRO/DX INJ SAME DRUG ADON: CPT

## 2020-08-26 PROCEDURE — 36415 COLL VENOUS BLD VENIPUNCTURE: CPT

## 2020-08-26 PROCEDURE — S0028 INJECTION, FAMOTIDINE, 20 MG: HCPCS

## 2020-08-26 PROCEDURE — 82550 ASSAY OF CK (CPK): CPT

## 2020-08-26 PROCEDURE — 96368 THER/DIAG CONCURRENT INF: CPT

## 2020-08-26 PROCEDURE — 96367 TX/PROPH/DG ADDL SEQ IV INF: CPT

## 2020-08-26 PROCEDURE — 71275 CT ANGIOGRAPHY CHEST: CPT

## 2020-08-26 PROCEDURE — 96366 THER/PROPH/DIAG IV INF ADDON: CPT

## 2020-08-26 PROCEDURE — 87040 BLOOD CULTURE FOR BACTERIA: CPT

## 2020-08-26 PROCEDURE — 93005 ELECTROCARDIOGRAM TRACING: CPT

## 2020-08-26 PROCEDURE — 85730 THROMBOPLASTIN TIME PARTIAL: CPT

## 2020-08-26 NOTE — RAD
EXAM: Single view of the chest



HISTORY:   Right lower extremity swelling and dyspnea



COMPARISON: 7/5/2020; CT chest 5/19/2020



FINDINGS: Single view of the chest shows a normal sized cardiomediastinal silhouette. There is stable
 widening of the paratracheal stripe on the right. Atherosclerotic calcifications are seen in the

aorta.  There is no evidence of consolidation, mass, or pleural effusion. No acute osseous abnormalit
y.



IMPRESSION: No evidence of acute cardiopulmonary disease



Reported By: Delfino Shaw 

Electronically Signed:  8/26/2020 5:47 PM

## 2020-08-26 NOTE — PDOC.HHP
Hospitalist HPI





- History of Present Illness


Leg swelling and pain


History of Present Illness: 


Patient brought in from [facility] due to complaints of right 

lower extremity pain and swelling.  She has edema at baseline but developed 

erythema and discomfort in the right lower extremity today.  She is bedbound 

due to history of a stroke with right-sided deficit and is AO x1 at baseline.  

Unable to obtain much history from the patient due to her mental status.





There was apparently difficulty obtaining IV access by EMS.





ED COURSE: In the emergency department the patient underwent an EKG that showed 

sinus tachycardia with a heart rate of 109.  She was said to appear tachypneic 

on arrival.  Due to difficulty obtaining IV access a central line was placed in 

the ED.  She was started on IV antibiotics (cefepime and vancomycin).  

Laboratory studies had shown her INR was subtherapeutic and therefore she was 

given a shot of Lovenox.  D-dimer had been ordered and elevated therefore 

patient underwent a CT angiogram was given full-strength Lovenox to cover for 

possible PE.  She was premedicated for possible iodine contrast and given 

Benadryl, Solu-Medrol and Pepcid.





She was admitted for sepsis secondary to cellulitis.  She underwent a venous 

Doppler in the ED that was negative for DVT.


She received IV fluids.





PAST MEDICAL HISTORY:


Type 2 diabetes mellitus, on insulin


Iron deficiency anemia


Hyperlipidemia


Hypertension


History of ischemic CVA with residual right-sided deficits





PAST SURGICAL HISTORY: None





SOCIAL HISTORY: Patient is bedbound due to history of CVA.  She lives in a 

nursing home.  No history of tobacco use alcohol consumption or illicit drug 

use.  





FAMILY HISTORY: Noncontributory





ALLERGIES: Codeine sulfate





CURRENT MEDICATIONS:


Amlodipine 10 mg p.o. daily


Clonidine 0.2 mg p.o. 3 times daily


Colace 100 mg p.o. daily


Ferrous sulfate 325 mg p.o. daily


Multivitamin 1 tablet p.o. daily


Pravastatin 10 mg p.o. daily at bedtime


Metformin 500 mg p.o. twice daily


Tramadol 50 mg 2 tablets p.o. at bedtime


Vitamin D3 2000 units p.o. daily


Warfarin 4 mg once daily Wednesday, 6 mg on Sunday Monday Tuesday Thursday Friday


Amaryl 2 mg twice daily before meals


Furosemide 40 mg p.o. daily


Lisinopril 40 mg p.o. daily


Potassium chloride 10 mEq p.o. daily


Exelon 3 mg twice daily


Hydralazine 25 mg p.o. 3 times daily


Albuterol 1 to 2 puffs every 4-6 hours as needed for cough or wheeze


Prednisone 40 mg p.o. daily





Hospitalist ROS





- Review of Systems


ROS unobtainable: due to mental status


Musculoskeletal: reports: leg pain





Hospitalist History





- Past Surgical History


Past Surgical History: reports: no pertinent history





- Social History


Alcohol: reports: None


Drugs: reports: none





- Exam


General Appearance: NAD, awake alert


General - other findings: Temp 99.4, , RR 20, O2 sat 91% RA, /82


Eye: PERRL, anicteric sclera


ENT: normocephalic atraumatic


Neck: supple, no lymphadenopathy


Heart: RRR


Respiratory: CTAB, normal chest expansion, no tachypnea


Gastrointestinal: soft, non-tender, non-distended, normal bowel sounds, no 

guarding, no rigidity


Extremities - other findings: LE edema right leg/foot worse than left, slight 

erythema of RLE, 


Neurological - other findings: Right sided deficit due to hx of stroke


Psychiatric: normal affect, oriented to person





Hospitalist Results





- Labs


Result Diagrams: 


 08/26/20 16:41





 08/26/20 16:41


Lab results: 


 











WBC  11.8 thou/uL (4.8-10.8)  H  08/26/20  16:41    


 


Hgb  10.5 g/dL (12.0-16.0)  L  08/26/20  16:41    


 


Hct  33.5 % (36.0-47.0)  L  08/26/20  16:41    


 


MCV  93.8 fL (78.0-98.0)   08/26/20  16:41    


 


Plt Count  444 thou/uL (130-400)  H  08/26/20  16:41    


 


Neutrophils %  59.3 % (42.0-75.0)   08/26/20  16:41    


 


Sodium  141 mmol/L (136-145)   08/26/20  16:41    


 


Potassium  3.8 mmol/L (3.5-5.1)   08/26/20  16:41    


 


Chloride  99 mmol/L ()   08/26/20  16:41    


 


Carbon Dioxide  30 mmol/L (23-31)   08/26/20  16:41    


 


BUN  13 mg/dL (9.8-20.1)   08/26/20  16:41    


 


Creatinine  1.27 mg/dL (0.6-1.1)  H  08/26/20  16:41    


 


Glucose  163 mg/dL ()  H  08/26/20  16:41    


 


Lactic Acid  1.4 mmol/L (0.5-2.2)   08/26/20  18:02    


 


Calcium  8.9 mg/dL (7.8-10.44)   08/26/20  16:41    


 


Total Bilirubin  0.2 mg/dL (0.2-1.2)   08/26/20  16:41    


 


AST  23 U/L (5-34)   08/26/20  16:41    


 


ALT  15 U/L (8-55)   08/26/20  16:41    


 


Alkaline Phosphatase  114 U/L ()  H  08/26/20  16:41    


 


Creatine Kinase  25 U/L ()  L  08/26/20  16:41    


 


Troponin I  0.025 ng/mL (< 0.028)   08/26/20  16:41    


 


Serum Total Protein  6.4 g/dL (6.0-8.3)   08/26/20  16:41    


 


Albumin  3.4 g/dL (3.4-4.8)   08/26/20  16:41    


 


Urine Ketones  Negative mg/dL (Negative)   08/26/20  17:36    


 


Urine Blood  2+  (Negative)  A  08/26/20  17:36    


 


Urine Nitrite  Negative  (Negative)   08/26/20  17:36    


 


Ur Leukocyte Esterase  Negative Oh/uL (Negative)   08/26/20  17:36    


 


Urine RBC  0-3 HPF (0-3)   08/26/20  17:36    


 


Urine WBC  0-3 HPF (0-3)   08/26/20  17:36    


 


Ur Squamous Epith Cells  0-3 HPF (0-3)   08/26/20  17:36    


 


Urine Bacteria  None Seen HPF (None Seen)   08/26/20  17:36    














- Radiology Interpretation


  ** CT scan - chest


Status: pending





Hospitalist H&P A/P





- Problem


(1) Cellulitis


Code(s): L03.90 - CELLULITIS, UNSPECIFIED   Status: Chronic   





(2) Right leg swelling


Code(s): M79.89 - OTHER SPECIFIED SOFT TISSUE DISORDERS   Status: Acute   





(3) Elevated d-dimer


Code(s): R79.89 - OTHER SPECIFIED ABNORMAL FINDINGS OF BLOOD CHEMISTRY   Status

: Acute   





(4) Chronic anticoagulation


Code(s): Z79.01 - LONG TERM (CURRENT) USE OF ANTICOAGULANTS   Status: Chronic   





(5) Hyperlipidemia


Code(s): E78.5 - HYPERLIPIDEMIA, UNSPECIFIED   Status: Chronic   





(6) Diabetes mellitus


Code(s): E11.9 - TYPE 2 DIABETES MELLITUS WITHOUT COMPLICATIONS   Status: 

Chronic   





(7) History of CVA (cerebrovascular accident)


Code(s): Z86.73 - PRSNL HX OF TIA (TIA), AND CEREB INFRC W/O RESID DEFICITS   

Status: Chronic   





(8) Hypertension


Code(s): I10 - ESSENTIAL (PRIMARY) HYPERTENSION   Status: Chronic   





- Plan


Plan: 


Continue IV antibiotics. 


Awaiting CTA results, given lovenox in ED. 


Monitor O2 sats. 


Monitor glucose.  


Sliding scale initiated.


Monitor BP. 


Gentle hydration.


Monitor renal function. 


Resume home medications as appropriate once verified. 


GI Prophylaxis.

## 2020-08-27 LAB
ANION GAP SERPL CALC-SCNC: 20 MMOL/L (ref 10–20)
BASOPHILS # BLD AUTO: 0 THOU/UL (ref 0–0.2)
BASOPHILS NFR BLD AUTO: 0.5 % (ref 0–1)
BUN SERPL-MCNC: 15 MG/DL (ref 9.8–20.1)
CALCIUM SERPL-MCNC: 8.2 MG/DL (ref 7.8–10.44)
CHLORIDE SERPL-SCNC: 101 MMOL/L (ref 98–107)
CO2 SERPL-SCNC: 21 MMOL/L (ref 23–31)
CREAT CL PREDICTED SERPL C-G-VRATE: 69 ML/MIN (ref 70–130)
EOSINOPHIL # BLD AUTO: 0 THOU/UL (ref 0–0.7)
EOSINOPHIL NFR BLD AUTO: 0.3 % (ref 0–10)
GLUCOSE SERPL-MCNC: 343 MG/DL (ref 80–115)
HGB BLD-MCNC: 10.9 G/DL (ref 12–16)
LYMPHOCYTES # BLD: 1.4 THOU/UL (ref 1.2–3.4)
LYMPHOCYTES NFR BLD AUTO: 18.6 % (ref 21–51)
MCH RBC QN AUTO: 29.9 PG (ref 27–31)
MCV RBC AUTO: 94.7 FL (ref 78–98)
MONOCYTES # BLD AUTO: 0.3 THOU/UL (ref 0.11–0.59)
MONOCYTES NFR BLD AUTO: 4.5 % (ref 0–10)
NEUTROPHILS # BLD AUTO: 5.7 THOU/UL (ref 1.4–6.5)
NEUTROPHILS NFR BLD AUTO: 76.1 % (ref 42–75)
PLATELET # BLD AUTO: 396 THOU/UL (ref 130–400)
POTASSIUM SERPL-SCNC: 4.5 MMOL/L (ref 3.5–5.1)
RBC # BLD AUTO: 3.65 MILL/UL (ref 4.2–5.4)
SODIUM SERPL-SCNC: 137 MMOL/L (ref 136–145)
WBC # BLD AUTO: 7.5 THOU/UL (ref 4.8–10.8)

## 2020-08-27 RX ADMIN — INSULIN GLARGINE SCH MLS: 100 INJECTION, SOLUTION SUBCUTANEOUS at 20:43

## 2020-08-27 RX ADMIN — INSULIN LISPRO PRN UNIT: 100 INJECTION, SOLUTION INTRAVENOUS; SUBCUTANEOUS at 06:30

## 2020-08-27 RX ADMIN — VANCOMYCIN SCH MLS: 1.5 INJECTION, SOLUTION INTRAVENOUS at 17:04

## 2020-08-27 RX ADMIN — INSULIN LISPRO PRN UNIT: 100 INJECTION, SOLUTION INTRAVENOUS; SUBCUTANEOUS at 17:04

## 2020-08-27 RX ADMIN — INSULIN LISPRO PRN UNIT: 100 INJECTION, SOLUTION INTRAVENOUS; SUBCUTANEOUS at 12:34

## 2020-08-27 NOTE — CT
CT ANGIOGRAM CHEST WITH CONTRAST:

8/26/20

 

HISTORY: 

Chest pain.

 

COMPARISON: 

Radiograph same day.

 

FINDINGS: 

CT angiogram chest performed after the intravenous administration of contrast. 3D rendering provided.
 

 

There is an embolism within the segmental branches of the left lower lobe anterior segment pulmonary 
artery. Old web within the posterior segment right lower lobe pulmonary artery. Small web in the dist
al left main pulmonary artery. No central intraluminal filling defect to suggest a hyper acute emboli
sm, the peripheral webs and strands are likely all chronic. 

 

Heart size is normal. No pericardial effusion. 

 

Limited evaluation of abdomen is unremarkable. No evidence for pneumonia. No pneumothorax. No effusio
n. No suspicious pulmonary nodule. 

 

Sternum and manubrium are intact. Thoracic spine is intact. 

 

IMPRESSION: 

Bilateral segmental and subsegmental pulmonary webs and peripheral eccentric thrombi are likely chron
ic embolism. No evidence for right heart strain.

 

Dr. Molina notified of findings via telephone at 7:30 p.m.

 

POS: HOME

## 2020-08-27 NOTE — PDOC.HOSPP
- Subjective


Encounter Date: 08/27/20


Encounter Time: 10:30


Subjective: 





f/u for RLE cellulitis on current Cefepime/Vancomycin. States feeling ok 

overall except for RLE swelling. 





- Objective


Vital Signs & Weight: 


 Vital Signs (12 hours)











  Temp Pulse Resp BP Pulse Ox


 


 08/27/20 08:40      94 L


 


 08/27/20 07:23  98.4 F  109 H  20  156/70 H  94 L


 


 08/27/20 04:17  97.7 F  106 H  12  146/78 H  94 L


 


 08/27/20 00:10  98.2 F  104 H   152/71 H  94 L








 Weight











Weight                         215 lb 9.6 oz














I&O: 


 











 08/26/20 08/27/20 08/28/20





 06:59 06:59 06:59


 


Output Total  3600 


 


Balance  -3600 











Result Diagrams: 


 08/27/20 06:59





 08/27/20 04:20


Additional Labs: 


 Accuchecks











  08/27/20 08/26/20





  05:29 23:41


 


POC Glucose  330 H  310 H








Microbiology





08/26/20 16:46   Artery - Left Leg   Blood Culture - Preliminary


                              Specimen has been received and culture in 

progress.


                              No Growth to date.


08/26/20 15:00   Venous blood - Right Arm   Blood Culture - Preliminary


                              Specimen has been received and culture in 

progress.


                              No Growth to date.





 Laboratory Tests











  08/26/20 08/26/20 08/26/20





  16:41 16:41 16:41


 


WBC  11.8 H  


 


D-Dimer    6.57 H


 


Creatinine   1.27 H 


 


Lactic Acid   














  08/26/20





  18:02


 


WBC 


 


D-Dimer 


 


Creatinine 


 


Lactic Acid  1.4











Radiology Reviewed by me: Yes (CTA chest - chronic lower lobe PE's)


EKG Reviewed by me: Yes (Tele - Sinus tachycardia)





Hospitalist ROS





- Medication


Medications: 


Active Medications











Generic Name Dose Route Start Last Admin





  Trade Name Freq  PRN Reason Stop Dose Admin


 


Enoxaparin Sodium  100 mg  08/27/20 09:00  08/27/20 08:30





  Lovenox  SC   100 mg





  0900,2100 ANNIE   Administration





     





     





     





     


 


Famotidine  20 mg  08/27/20 09:00  08/27/20 08:30





  Pepcid  SLOW IVP   20 mg





  Q12HR ANNIE   Administration





     





     





     





     


 


Sodium Chloride  1,000 mls @ 50 mls/hr  08/26/20 23:15  08/26/20 23:33





  Normal Saline 0.9%  IV   1,000 mls





  .Q20H ANNIE   Administration





     





     





     





     


 


Insulin Human Lispro  0 units  08/26/20 21:34  08/27/20 06:30





  Humalog  SC   5 unit





  .MILD SLIDING SCALE PRN   Administration





  Mild Correctional Scale   





     





     





     


 


Insulin Human Lispro  0 units  08/26/20 21:34  08/26/20 23:50





  Humalog  SC   4 unit





  .BEDTIME SLIDING SC PRN   Administration





  Bedtime Correctional Scale   





     





     





     














- Exam


General Appearance: NAD, awake alert


Eye: PERRL, anicteric sclera


ENT: normocephalic atraumatic, no oropharyngeal lesions


Neck: supple, symmetric, no JVD, no thyromegaly, no lymphadenopathy


Heart: no gallops, no rubs, normal peripheral pulses


Heart - other findings: S1, S2 with tachycardia


Respiratory: CTAB, no wheezes, no rales, no ronchi, normal chest expansion


Gastrointestinal: soft, non-tender, non-distended, normal bowel sounds, no 

palpable masses


Extremities - other findings: RLE with erythema in mid-shin, chronic changes of 

the feet


Skin: normal turgor


Neurological: cranial nerve grossly intact, no new deficit


Neurological - other findings: R hemparesis, bed bound chronically


Musculoskeletal: generalized weakness


Psychiatric: normal affect, A&O x 3





Hosp A/P


(1) Cellulitis of right lower extremity


Code(s): L03.115 - CELLULITIS OF RIGHT LOWER LIMB   Status: Acute   


Plan: 


Continue Cefepime/Vancomycin, Elevate LE while in bed








(2) Pulmonary emboli


Code(s): I26.99 - OTHER PULMONARY EMBOLISM WITHOUT ACUTE COR PULMONALE   Status

: Chronic   


Qualifiers: 


   Chronicity: chronic 


Plan: 


Chronic bilat PE's, continue anticoagulation with Coumadin, no O2 requirement








(3) Chronic anticoagulation


Code(s): Z79.01 - LONG TERM (CURRENT) USE OF ANTICOAGULANTS   Status: Chronic   


Plan: 


Subtherapeutic INR, restart Coumadin 10mg daily, PT/INR daily, Lovenox bridge








(4) CKD (chronic kidney disease), stage III


Code(s): N18.3 - CHRONIC KIDNEY DISEASE, STAGE 3 (MODERATE)   Status: Chronic   


Plan: 


Avoid nephrotoxic meds and limit contrast








(5) Diabetes mellitus, type II, insulin dependent


Code(s): E11.9 - TYPE 2 DIABETES MELLITUS WITHOUT COMPLICATIONS; Z79.4 - LONG 

TERM (CURRENT) USE OF INSULIN   Status: Chronic   


Plan: 


Resume home insulin regimen, ISS, ADA, serial accuchecks








- Plan


continue antibiotics, 





Stable currently


Continue Cefepime/Vancomycin


Resume Coumadin


Continue Lovenox bridging


Resume home meds/Insulin regimen


AM lab: CMP, CBC, PT/INR

## 2020-08-28 LAB
ALBUMIN SERPL BCG-MCNC: 3.3 G/DL (ref 3.4–4.8)
ALP SERPL-CCNC: 99 U/L (ref 40–110)
ALT SERPL W P-5'-P-CCNC: 9 U/L (ref 8–55)
ANION GAP SERPL CALC-SCNC: 15 MMOL/L (ref 10–20)
AST SERPL-CCNC: 14 U/L (ref 5–34)
BASOPHILS # BLD AUTO: 0.1 THOU/UL (ref 0–0.2)
BASOPHILS NFR BLD AUTO: 0.6 % (ref 0–1)
BILIRUB SERPL-MCNC: 0.3 MG/DL (ref 0.2–1.2)
BUN SERPL-MCNC: 14 MG/DL (ref 9.8–20.1)
CALCIUM SERPL-MCNC: 8.6 MG/DL (ref 7.8–10.44)
CHLORIDE SERPL-SCNC: 102 MMOL/L (ref 98–107)
CO2 SERPL-SCNC: 27 MMOL/L (ref 23–31)
CREAT CL PREDICTED SERPL C-G-VRATE: 70 ML/MIN (ref 70–130)
EOSINOPHIL # BLD AUTO: 0.1 THOU/UL (ref 0–0.7)
EOSINOPHIL NFR BLD AUTO: 0.9 % (ref 0–10)
GLOBULIN SER CALC-MCNC: 3 G/DL (ref 2.4–3.5)
GLUCOSE SERPL-MCNC: 191 MG/DL (ref 80–115)
HGB BLD-MCNC: 10.4 G/DL (ref 12–16)
INR PPP: 1
LYMPHOCYTES # BLD: 3.6 THOU/UL (ref 1.2–3.4)
LYMPHOCYTES NFR BLD AUTO: 35.5 % (ref 21–51)
MCH RBC QN AUTO: 29.7 PG (ref 27–31)
MCV RBC AUTO: 93.4 FL (ref 78–98)
MONOCYTES # BLD AUTO: 0.8 THOU/UL (ref 0.11–0.59)
MONOCYTES NFR BLD AUTO: 8.1 % (ref 0–10)
NEUTROPHILS # BLD AUTO: 5.6 THOU/UL (ref 1.4–6.5)
NEUTROPHILS NFR BLD AUTO: 55 % (ref 42–75)
PLATELET # BLD AUTO: 416 THOU/UL (ref 130–400)
POTASSIUM SERPL-SCNC: 3.8 MMOL/L (ref 3.5–5.1)
PROTHROMBIN TIME: 13.2 SEC (ref 12–14.7)
RBC # BLD AUTO: 3.51 MILL/UL (ref 4.2–5.4)
SODIUM SERPL-SCNC: 140 MMOL/L (ref 136–145)
VANCOMYCIN TROUGH SERPL-MCNC: 16.7 UG/ML
WBC # BLD AUTO: 10.2 THOU/UL (ref 4.8–10.8)

## 2020-08-28 RX ADMIN — INSULIN LISPRO PRN UNIT: 100 INJECTION, SOLUTION INTRAVENOUS; SUBCUTANEOUS at 12:37

## 2020-08-28 RX ADMIN — INSULIN LISPRO PRN UNIT: 100 INJECTION, SOLUTION INTRAVENOUS; SUBCUTANEOUS at 17:40

## 2020-08-28 RX ADMIN — Medication SCH UNITS: at 08:44

## 2020-08-28 RX ADMIN — VANCOMYCIN SCH MLS: 1.5 INJECTION, SOLUTION INTRAVENOUS at 15:34

## 2020-08-28 RX ADMIN — INSULIN GLARGINE SCH MLS: 100 INJECTION, SOLUTION SUBCUTANEOUS at 20:48

## 2020-08-28 RX ADMIN — INSULIN LISPRO PRN UNIT: 100 INJECTION, SOLUTION INTRAVENOUS; SUBCUTANEOUS at 06:56

## 2020-08-28 RX ADMIN — THERA TABS SCH TAB: TAB at 08:44

## 2020-08-28 NOTE — PDOC.HOSPP
- Subjective


Encounter Date: 08/28/20


Encounter Time: 10:40


Subjective: 





f/u for RLE cellulitis on Cefepime/Vancomycin





- Objective


Vital Signs & Weight: 


 Vital Signs (12 hours)











  Temp Pulse Resp BP Pulse Ox


 


 08/28/20 08:33  98.4 F  109 H  18  180/79 H  96


 


 08/28/20 03:56  98.8 F  111 H  18  142/71 H  93 L








 Weight











Admit Weight                   215 lb 9.6 oz


 


Weight                         205 lb 4.8 oz














I&O: 


 











 08/27/20 08/28/20 08/29/20





 06:59 06:59 06:59


 


Intake Total  1740 


 


Output Total 3600 3900 


 


Balance -3600 -2160 











Result Diagrams: 


 08/28/20 04:21





 08/28/20 04:21


Additional Labs: 


 Accuchecks











  08/28/20 08/27/20 08/27/20





  05:28 20:03 16:54


 


POC Glucose  184 H  261 H  280 H














  08/27/20





  11:58


 


POC Glucose  314 H








Microbiology





08/26/20 17:36   Urine voided   Urine Culture - Preliminary


08/26/20 16:46   Artery - Left Leg   Blood Culture - Preliminary


                              Specimen has been received and culture in 

progress.


                              No Growth to date.


08/26/20 15:00   Venous blood - Right Arm   Blood Culture - Preliminary


                              Specimen has been received and culture in 

progress.


                              No Growth to date.





 Laboratory Tests











  08/26/20 08/26/20 08/26/20





  16:41 16:41 16:41


 


WBC  11.8 H  


 


PT   


 


INR   


 


D-Dimer    6.57 H


 


Creatinine   1.27 H 


 


Lactic Acid   














  08/26/20 08/28/20





  18:02 04:21


 


WBC  


 


PT   13.2


 


INR   1.0


 


D-Dimer  


 


Creatinine  


 


Lactic Acid  1.4 











EKG Reviewed by me: Yes (Tele - sinus tachycardia)





Hospitalist ROS





- Medication


Medications: 


Active Medications











Generic Name Dose Route Start Last Admin





  Trade Name Freq  PRN Reason Stop Dose Admin


 


Amlodipine Besylate  10 mg  08/28/20 09:00  08/28/20 08:44





  Norvasc  PO   10 mg





  DAILY ANNIE   Administration





     





     





     





     


 


Atorvastatin Calcium  40 mg  08/27/20 21:00  08/27/20 20:44





  Lipitor  PO   40 mg





  HS ANNIE   Administration





     





     





     





     


 


Cholecalciferol  2,000 units  08/28/20 09:00  08/28/20 08:44





  Vitamin D3  PO   2,000 units





  DAILY ANNIE   Administration





     





     





     





     


 


Enoxaparin Sodium  100 mg  08/27/20 09:00  08/28/20 08:43





  Lovenox  SC   100 mg





  0900,2100 ANNIE   Administration





     





     





     





     


 


Ferrous Sulfate  325 mg  08/27/20 21:00  08/28/20 08:44





  Feosol  PO   325 mg





  BID ANNIE   Administration





     





     





     





     


 


Furosemide  20 mg  08/27/20 14:00  08/28/20 08:45





  Lasix  PO   20 mg





  0900,1400 ANNIE   Administration





     





     





     





     


 


Hydralazine HCl  25 mg  08/28/20 09:00  08/28/20 08:45





  Apresoline  PO   25 mg





  DAILY ANNIE   Administration





     





     





     





     


 


Cefepime HCl 2 gm/ Sodium  100 mls @ 200 mls/hr  08/27/20 17:00  08/27/20 17:17





  Chloride  IVPB   100 mls





  1700 ANNIE   Administration





     





     





     





     


 


Sodium Chloride  1,000 mls @ 50 mls/hr  08/26/20 23:15  08/26/20 23:33





  Normal Saline 0.9%  IV   1,000 mls





  .Q20H ANNIE   Administration





     





     





     





     


 


Vancomycin HCl 1.5 gm/ Device  300 mls @ 200 mls/hr  08/27/20 15:00  08/27/20 17

:04





  IVPB   300 mls





  1500 ANNIE   Administration





     





     





     





     


 


Insulin Glargine 30 units/  0.3 mls @ 0 mls/hr  08/27/20 21:00  08/27/20 20:43





  Miscellaneous Medication  SC   0.3 mls





  HS ANNIE   Administration





     





     





     





     


 


Insulin Human Lispro  0 units  08/26/20 21:34  08/28/20 06:56





  Humalog  SC   2 unit





  .MILD SLIDING SCALE PRN   Administration





  Mild Correctional Scale   





     





     





     


 


Insulin Human Lispro  0 units  08/26/20 21:34  08/26/20 23:50





  Humalog  SC   4 unit





  .BEDTIME SLIDING SC PRN   Administration





  Bedtime Correctional Scale   





     





     





     


 


Lisinopril  40 mg  08/28/20 09:00  08/28/20 08:43





  Zestril  PO   40 mg





  DAILY ANNIE   Administration





     





     





     





     


 


Magnesium Oxide  400 mg  08/27/20 21:00  08/28/20 08:45





  Magnesium Oxide  PO   400 mg





  BID ANNIE   Administration





     





     





     





     


 


Metformin HCl  500 mg  08/27/20 21:00  08/28/20 08:45





  Glucophage  PO   500 mg





  BID ANNIE   Administration





     





     





     





     


 


Multivitamins  1 tab  08/28/20 09:00  08/28/20 08:44





  Theragran  PO   1 tab





  DAILY ANNIE   Administration





     





     





     





     


 


Pantoprazole Sodium  40 mg  08/27/20 21:00  08/28/20 08:44





  Protonix  PO   40 mg





  BID ANNIE   Administration





     





     





     





     


 


Potassium Chloride  10 meq  08/27/20 10:00  08/27/20 12:42





  Klor-Con 10  PO   10 meq





  Q2DAYS ANNIE   Administration





     





     





     





     


 


Rivastigmine  3 mg  08/27/20 15:00  08/28/20 08:44





  Exelon  PO   3 mg





  TID ANNIE   Administration





     





     





     





     


 


Trazodone HCl  75 mg  08/27/20 21:00  08/27/20 20:45





  Desyrel  PO   75 mg





  HS ANNIE   Administration





     





     





     





     


 


Warfarin Sodium  10 mg  08/27/20 17:00  08/27/20 17:17





  Coumadin  PO   10 mg





  1700 ANNIE   Administration





     





     





     





     














- Exam


General Appearance: NAD, awake alert


Eye: PERRL, anicteric sclera


ENT: normocephalic atraumatic, no oropharyngeal lesions


Neck: supple, symmetric, no JVD, no thyromegaly


Heart: no gallops, no rubs, normal peripheral pulses


Heart - other findings: S1, S2 with tachycardia


Respiratory: CTAB, no wheezes, no rales, no ronchi, normal chest expansion


Gastrointestinal: soft, non-tender, non-distended, normal bowel sounds, no 

palpable masses


Extremities: no cyanosis


Extremities - other findings: RLE with decreased erythema mid-shin


Skin: normal turgor


Neurological: cranial nerve grossly intact, no new deficit


Neurological - other findings: R hemiparesis(chronic), chronic bed-bound 


Musculoskeletal: generalized weakness


Psychiatric: oriented to person, oriented to place





Hosp A/P


(1) Cellulitis of right lower extremity


Code(s): L03.115 - CELLULITIS OF RIGHT LOWER LIMB   Status: Acute   


Plan: 


Improving, continue Cefepime/Vancomycin








(2) Pulmonary emboli


Code(s): I26.99 - OTHER PULMONARY EMBOLISM WITHOUT ACUTE COR PULMONALE   Status

: Chronic   


Qualifiers: 


   Chronicity: chronic 


Plan: 


Continue Lovenox bridging, Coumadin 10mg daily with PT/INR monitoring, INR 

currently 1.0








(3) Chronic anticoagulation


Code(s): Z79.01 - LONG TERM (CURRENT) USE OF ANTICOAGULANTS   Status: Chronic   





(4) CKD (chronic kidney disease), stage III


Code(s): N18.3 - CHRONIC KIDNEY DISEASE, STAGE 3 (MODERATE)   Status: Chronic   


Plan: 


Avoid nephrotoxic meds and limit contrast exposure








(5) Diabetes mellitus, type II, insulin dependent


Code(s): E11.9 - TYPE 2 DIABETES MELLITUS WITHOUT COMPLICATIONS; Z79.4 - LONG 

TERM (CURRENT) USE OF INSULIN   Status: Chronic   


Plan: 


Continue Glargine 30u HS/Metformin/ISS, ADA, serial accuchecks








- Plan


continue antibiotics, 





Stable currently


Continue Cefepime/Vancomycin


Resume Coumadin 10mg daily, subtherapeutic INR currently


Continue Lovenox bridging


Resume home meds/Insulin regimen


AM lab: BMP, PT/INR, Vanc trough

## 2020-08-29 LAB
ANION GAP SERPL CALC-SCNC: 18 MMOL/L (ref 10–20)
BUN SERPL-MCNC: 18 MG/DL (ref 9.8–20.1)
CALCIUM SERPL-MCNC: 8.5 MG/DL (ref 7.8–10.44)
CHLORIDE SERPL-SCNC: 103 MMOL/L (ref 98–107)
CO2 SERPL-SCNC: 23 MMOL/L (ref 23–31)
CREAT CL PREDICTED SERPL C-G-VRATE: 57 ML/MIN (ref 70–130)
GLUCOSE SERPL-MCNC: 176 MG/DL (ref 80–115)
INR PPP: 1.1
MAGNESIUM SERPL-MCNC: 1.5 MG/DL (ref 1.6–2.6)
POTASSIUM SERPL-SCNC: 3.9 MMOL/L (ref 3.5–5.1)
PROTHROMBIN TIME: 14.6 SEC (ref 12–14.7)
SODIUM SERPL-SCNC: 140 MMOL/L (ref 136–145)

## 2020-08-29 RX ADMIN — Medication SCH UNITS: at 09:29

## 2020-08-29 RX ADMIN — VANCOMYCIN SCH MLS: 1.5 INJECTION, SOLUTION INTRAVENOUS at 15:04

## 2020-08-29 RX ADMIN — INSULIN LISPRO PRN UNIT: 100 INJECTION, SOLUTION INTRAVENOUS; SUBCUTANEOUS at 11:15

## 2020-08-29 RX ADMIN — INSULIN GLARGINE SCH MLS: 100 INJECTION, SOLUTION SUBCUTANEOUS at 20:12

## 2020-08-29 RX ADMIN — INSULIN LISPRO PRN UNIT: 100 INJECTION, SOLUTION INTRAVENOUS; SUBCUTANEOUS at 18:02

## 2020-08-29 RX ADMIN — THERA TABS SCH TAB: TAB at 09:32

## 2020-08-29 RX ADMIN — INSULIN LISPRO SCH UNIT: 100 INJECTION, SOLUTION INTRAVENOUS; SUBCUTANEOUS at 17:59

## 2020-08-29 NOTE — PDOC.HOSPP
- Subjective


Encounter Date: 08/29/20


Encounter Time: 09:00


Subjective: 





no overnight events. this morning, feeling well, leg pain improved.





- Objective


Vital Signs & Weight: 


 Vital Signs (12 hours)











  Temp Pulse Resp BP Pulse Ox


 


 08/29/20 15:13  98.9 F  106 H  16  137/63  95


 


 08/29/20 11:10  98.7 F  105 H  17  145/65 H  96


 


 08/29/20 07:46  99.1 F  102 H  17  140/65  95








 Weight











Admit Weight                   215 lb 9.6 oz


 


Weight                         205 lb 4.8 oz














I&O: 


 











 08/28/20 08/29/20 08/30/20





 06:59 06:59 06:59


 


Intake Total 1740 2510 


 


Output Total 3900 3000 


 


Balance -2160 -490 











Result Diagrams: 


 08/28/20 04:21





 08/29/20 04:12


Additional Labs: 


 Accuchecks











  08/29/20 08/29/20 08/28/20





  10:46 05:39 20:33


 


POC Glucose  202 H  162 H  178 H














  08/28/20





  16:20


 


POC Glucose  217 H














Hospitalist ROS





- Review of Systems


Constitutional: denies: chills, sweats


Respiratory: denies: cough, dry, shortness of breath


Cardiovascular: denies: chest pain, palpitations, orthopnea


Gastrointestinal: denies: nausea, vomiting, abdominal pain


Genitourinary: denies: dysuria, frequency, hematuria





- Medication


Medications: 


Active Medications











Generic Name Dose Route Start Last Admin





  Trade Name Indioq  PRN Reason Stop Dose Admin


 


Amlodipine Besylate  10 mg  08/28/20 09:00  08/29/20 09:30





  Norvasc  PO   10 mg





  DAILY ANNIE   Administration





     





     





     





     


 


Atorvastatin Calcium  40 mg  08/27/20 21:00  08/28/20 20:49





  Lipitor  PO   40 mg





  HS ANNIE   Administration





     





     





     





     


 


Cholecalciferol  2,000 units  08/28/20 09:00  08/29/20 09:29





  Vitamin D3  PO   2,000 units





  DAILY ANNIE   Administration





     





     





     





     


 


Enoxaparin Sodium  100 mg  08/27/20 09:00  08/29/20 09:29





  Lovenox  SC   100 mg





  0900,2100 ANNIE   Administration





     





     





     





     


 


Ferrous Sulfate  325 mg  08/27/20 21:00  08/29/20 09:31





  Feosol  PO   325 mg





  BID ANNIE   Administration





     





     





     





     


 


Hydralazine HCl  25 mg  08/28/20 09:00  08/29/20 09:31





  Apresoline  PO   25 mg





  DAILY ANNIE   Administration





     





     





     





     


 


Vancomycin HCl 1.5 gm/ Device  300 mls @ 200 mls/hr  08/27/20 15:00  08/29/20 15

:04





  IVPB   300 mls





  1500 ANNIE   Administration





     





     





     





     


 


Insulin Glargine 30 units/  0.3 mls @ 0 mls/hr  08/27/20 21:00  08/28/20 20:48





  Miscellaneous Medication  SC   0.3 mls





  HS ANNIE   Administration





     





     





     





     


 


Insulin Human Lispro  0 units  08/26/20 21:34  08/29/20 11:15





  Humalog  SC   3 unit





  .MILD SLIDING SCALE PRN   Administration





  Mild Correctional Scale   





     





     





     


 


Insulin Human Lispro  0 units  08/26/20 21:34  08/26/20 23:50





  Humalog  SC   4 unit





  .BEDTIME SLIDING SC PRN   Administration





  Bedtime Correctional Scale   





     





     





     


 


Lisinopril  40 mg  08/28/20 09:00  08/29/20 09:30





  Zestril  PO   40 mg





  DAILY ANNIE   Administration





     





     





     





     


 


Magnesium Oxide  400 mg  08/27/20 21:00  08/29/20 09:31





  Magnesium Oxide  PO   400 mg





  BID ANNIE   Administration





     





     





     





     


 


Magnesium Oxide  400 mg  08/29/20 13:00  08/29/20 13:23





  Magnesium Oxide  PO  08/29/20 17:00  400 mg





  1300 ANNIE   Administration





     





     





     





     


 


Metformin HCl  500 mg  08/27/20 21:00  08/29/20 09:30





  Glucophage  PO   500 mg





  BID ANNIE   Administration





     





     





     





     


 


Multivitamins  1 tab  08/28/20 09:00  08/29/20 09:32





  Theragran  PO   1 tab





  DAILY ANNIE   Administration





     





     





     





     


 


Pantoprazole Sodium  40 mg  08/27/20 21:00  08/29/20 09:31





  Protonix  PO   40 mg





  BID ANNIE   Administration





     





     





     





     


 


Potassium Chloride  10 meq  08/27/20 10:00  08/29/20 09:31





  Klor-Con 10  PO   10 meq





  Q2DAYS ANNIE   Administration





     





     





     





     


 


Rivastigmine  3 mg  08/27/20 15:00  08/29/20 15:04





  Exelon  PO   3 mg





  TID ANNIE   Administration





     





     





     





     


 


Trazodone HCl  75 mg  08/27/20 21:00  08/28/20 20:48





  Desyrel  PO   75 mg





  HS ANNIE   Administration





     





     





     





     


 


Warfarin Sodium  10 mg  08/27/20 17:00  08/28/20 17:42





  Coumadin  PO   10 mg





  1700 ANNIE   Administration





     





     





     





     














- Exam


General Appearance: NAD, awake alert


Neck: no JVD


Heart: RRR, no murmur, no gallops, no rubs


Respiratory: CTAB, no wheezes, no rales, no ronchi


Gastrointestinal: soft, non-tender, non-distended, normal bowel sounds


Extremities - other findings: right lower extremity erythema resolved; pain 

persists





Hosp A/P





- Plan





(1) Mild Cellulitis of right lower extremity


Code(s): L03.115 - CELLULITIS OF RIGHT LOWER LIMB   Status: Acute   


Plan: 


Improving,continue vancomycin; stop cefepime








(2) Pulmonary emboli


Code(s): I26.99 - OTHER PULMONARY EMBOLISM WITHOUT ACUTE COR PULMONALE   Status

: Chronic   


Qualifiers: 


   Chronicity: chronic 


Plan: 


Continue Lovenox bridging, Coumadin 10mg daily with PT/INR monitoring, INR 

currently 1.0








(3) Chronic anticoagulation


Code(s): Z79.01 - LONG TERM (CURRENT) USE OF ANTICOAGULANTS   Status: Chronic   





(4) CKD (chronic kidney disease), stage III


Code(s): N18.3 - CHRONIC KIDNEY DISEASE, STAGE 3 (MODERATE)   Status: Chronic   


Plan: 


worsening creatitinine; deescalated lasix, stop IVF








(5) Diabetes mellitus, type II, insulin dependent


Code(s): E11.9 - TYPE 2 DIABETES MELLITUS WITHOUT COMPLICATIONS; Z79.4 - LONG 

TERM (CURRENT) USE OF INSULIN   Status: Chronic   


Plan: 


Continue Glargine 30u HS/Metformin/ISS, ADA, serial accuchecks





Full code


ELOS: 1-2 nights

## 2020-08-29 NOTE — EKG
Test Reason : 

Blood Pressure : ***/*** mmHG

Vent. Rate : 109 BPM     Atrial Rate : 109 BPM

   P-R Int : 176 ms          QRS Dur : 078 ms

    QT Int : 334 ms       P-R-T Axes : 070 078 071 degrees

   QTc Int : 449 ms

 

Sinus tachycardia

Possible Left atrial enlargement

Septal infarct , age undetermined

Abnormal ECG

 

Confirmed by CATALINA BONILLA DO ([id number]),  CARLOS ENRIQUE ([id number]) on 8/29/2020 3:19:54 PM

 

Referred By:             Confirmed By:CATALINA BONILLA DO

## 2020-08-30 LAB
ANION GAP SERPL CALC-SCNC: 16 MMOL/L (ref 10–20)
BUN SERPL-MCNC: 17 MG/DL (ref 9.8–20.1)
CALCIUM SERPL-MCNC: 8.8 MG/DL (ref 7.8–10.44)
CHLORIDE SERPL-SCNC: 103 MMOL/L (ref 98–107)
CO2 SERPL-SCNC: 25 MMOL/L (ref 23–31)
CREAT CL PREDICTED SERPL C-G-VRATE: 64 ML/MIN (ref 70–130)
GLUCOSE SERPL-MCNC: 152 MG/DL (ref 80–115)
INR PPP: 1.5
MAGNESIUM SERPL-MCNC: 1.5 MG/DL (ref 1.6–2.6)
POTASSIUM SERPL-SCNC: 3.7 MMOL/L (ref 3.5–5.1)
PROTHROMBIN TIME: 18.2 SEC (ref 12–14.7)
SODIUM SERPL-SCNC: 140 MMOL/L (ref 136–145)

## 2020-08-30 RX ADMIN — INSULIN LISPRO SCH: 100 INJECTION, SOLUTION INTRAVENOUS; SUBCUTANEOUS at 08:20

## 2020-08-30 RX ADMIN — THERA TABS SCH TAB: TAB at 08:29

## 2020-08-30 RX ADMIN — INSULIN LISPRO SCH UNIT: 100 INJECTION, SOLUTION INTRAVENOUS; SUBCUTANEOUS at 17:21

## 2020-08-30 RX ADMIN — Medication SCH UNITS: at 08:27

## 2020-08-30 RX ADMIN — INSULIN LISPRO PRN UNIT: 100 INJECTION, SOLUTION INTRAVENOUS; SUBCUTANEOUS at 06:34

## 2020-08-30 RX ADMIN — INSULIN LISPRO SCH UNIT: 100 INJECTION, SOLUTION INTRAVENOUS; SUBCUTANEOUS at 12:06

## 2020-08-30 RX ADMIN — INSULIN GLARGINE SCH MLS: 100 INJECTION, SOLUTION SUBCUTANEOUS at 21:18

## 2020-08-30 NOTE — PDOC.HOSPP
- Subjective


Encounter Date: 08/30/20


Encounter Time: 09:00


Subjective: 





Overnight, 4 vtach beats, asymptomatic, PATs, and short SVT. this morning, 

right lower extremity pain persists. Otherwise no complaints.





- Objective


Vital Signs & Weight: 


 Vital Signs (12 hours)











  Temp Pulse Resp BP Pulse Ox


 


 08/30/20 15:09  97.1 F L  96  18  117/58 L  95


 


 08/30/20 11:17  98.0 F  88  18  114/59 L  96


 


 08/30/20 08:26     127/59 L 


 


 08/30/20 07:33  98.3 F  102 H  16  118/58 L  94 L








 Weight











Admit Weight                   215 lb 9.6 oz


 


Weight                         205 lb 4.8 oz














I&O: 


 











 08/29/20 08/30/20 08/31/20





 06:59 06:59 06:59


 


Intake Total 2510 2020 960


 


Output Total 3000 1250 425


 


Balance -490 770 535











Result Diagrams: 


 08/28/20 04:21





 08/30/20 03:57


Additional Labs: 


 Accuchecks











  08/30/20 08/30/20 08/30/20





  16:51 10:54 05:57


 


POC Glucose  143 H  158 H  164 H














  08/29/20 08/29/20





  23:59 19:45


 


POC Glucose  165 H  149 H














Hospitalist ROS





- Review of Systems


Constitutional: denies: chills, sweats


Respiratory: denies: cough, shortness of breath


Cardiovascular: denies: chest pain, palpitations, orthopnea


Gastrointestinal: denies: nausea, vomiting, abdominal pain





- Medication


Medications: 


Active Medications











Generic Name Dose Route Start Last Admin





  Trade Name Jayshree  PRN Reason Stop Dose Admin


 


Amlodipine Besylate  10 mg  08/28/20 09:00  08/30/20 08:27





  Norvasc  PO   10 mg





  DAILY ANNIE   Administration





     





     





     





     


 


Atorvastatin Calcium  40 mg  08/27/20 21:00  08/29/20 20:15





  Lipitor  PO   40 mg





  HS ANNIE   Administration





     





     





     





     


 


Cholecalciferol  2,000 units  08/28/20 09:00  08/30/20 08:27





  Vitamin D3  PO   2,000 units





  DAILY ANNIE   Administration





     





     





     





     


 


Enoxaparin Sodium  100 mg  08/27/20 09:00  08/30/20 08:29





  Lovenox  SC   100 mg





  0900,2100 ANNIE   Administration





     





     





     





     


 


Ferrous Sulfate  325 mg  08/27/20 21:00  08/30/20 08:27





  Feosol  PO   325 mg





  BID ANNIE   Administration





     





     





     





     


 


Furosemide  20 mg  08/30/20 09:00  08/30/20 08:28





  Lasix  PO   20 mg





  0900 ANNIE   Administration





     





     





     





     


 


Hydralazine HCl  25 mg  08/28/20 09:00  08/30/20 08:29





  Apresoline  PO   25 mg





  DAILY ANNIE   Administration





     





     





     





     


 


Insulin Glargine 20 units/  0.2 mls @ 0 mls/hr  08/29/20 16:31  08/29/20 20:12





  Miscellaneous Medication  SC   0.2 mls





  HS ANNIE   Administration





     





     





     





  As Directed   


 


Insulin Human Lispro  0 units  08/26/20 21:34  08/30/20 06:34





  Humalog  SC   2 unit





  .MILD SLIDING SCALE PRN   Administration





  Mild Correctional Scale   





     





     





     


 


Insulin Human Lispro  0 units  08/26/20 21:34  08/26/20 23:50





  Humalog  SC   4 unit





  .BEDTIME SLIDING SC PRN   Administration





  Bedtime Correctional Scale   





     





     





     


 


Insulin Human Lispro  7 units  08/29/20 17:00  08/30/20 17:21





  Humalog  SC   7 unit





  AC ANNIE   Administration





     





     





     





     


 


Lisinopril  40 mg  08/28/20 09:00  08/30/20 08:28





  Zestril  PO   40 mg





  DAILY ANNIE   Administration





     





     





     





     


 


Magnesium Oxide  400 mg  08/30/20 15:00  08/30/20 15:28





  Magnesium Oxide  PO   400 mg





  TID ANNIE   Administration





     





     





     





     


 


Metformin HCl  500 mg  08/27/20 21:00  08/30/20 08:28





  Glucophage  PO   500 mg





  BID ANNIE   Administration





     





     





     





     


 


Multivitamins  1 tab  08/28/20 09:00  08/30/20 08:29





  Theragran  PO   1 tab





  DAILY ANNIE   Administration





     





     





     





     


 


Pantoprazole Sodium  40 mg  08/27/20 21:00  08/30/20 08:28





  Protonix  PO   40 mg





  BID ANNIE   Administration





     





     





     





     


 


Potassium Chloride  10 meq  08/27/20 10:00  08/29/20 09:31





  Klor-Con 10  PO   10 meq





  Q2DAYS ANNIE   Administration





     





     





     





     


 


Rivastigmine  3 mg  08/27/20 15:00  08/30/20 15:28





  Exelon  PO   3 mg





  TID ANNIE   Administration





     





     





     





     


 


Sodium Chloride  10 ml  08/26/20 21:32  08/30/20 08:30





  Flush - Normal Saline  IVF   10 ml





  Q12HR PRN   Administration





  Saline Flush   





     





     





     


 


Tramadol HCl  50 mg  08/27/20 09:46  08/30/20 06:22





  Ultram  PO   50 mg





  BID PRN   Administration





  Pain   





     





     





     


 


Trazodone HCl  75 mg  08/27/20 21:00  08/29/20 20:16





  Desyrel  PO   75 mg





  HS ANNIE   Administration





     





     





     





     


 


Warfarin Sodium  10 mg  08/27/20 17:00  08/30/20 17:21





  Coumadin  PO   10 mg





  1700 ANNIE   Administration





     





     





     





     














- Exam


General Appearance: NAD, awake alert


Neck: no JVD


Heart: RRR, no murmur, no gallops, no rubs


Respiratory: CTAB, no wheezes, no rales, no ronchi


Gastrointestinal: soft, non-tender, non-distended, normal bowel sounds


Extremities - other findings: b/l pitting edema; RLE tenderness, verucous edema

, no erythema or warmth


Psychiatric: oriented to person, oriented to place, flat affect.  negative: 

oriented to time





Hosp A/P





- Plan





(1) RLE verucous lymphedema


Code(s): L03.115 - CELLULITIS OF RIGHT LOWER LIMB   Status: Acute   


Plan: 


Likely related to right hemoparesis and venous insufficiency





-stopped vancomycin; started compression stocking if tolerated despite pain


-wound care consulted








(2) Pulmonary emboli


Code(s): I26.99 - OTHER PULMONARY EMBOLISM WITHOUT ACUTE COR PULMONALE   Status

: Chronic   


Qualifiers: 


   Chronicity: chronic 


Plan: 


Continue Lovenox bridging, Coumadin per pharm








(3) Chronic anticoagulation


Code(s): Z79.01 - LONG TERM (CURRENT) USE OF ANTICOAGULANTS   Status: Chronic   





(4) CKD (chronic kidney disease), stage III


Code(s): N18.3 - CHRONIC KIDNEY DISEASE, STAGE 3 (MODERATE)   Status: Chronic   


Plan: 


worsening creatitinine; deescalated lasix, stop IVF (8/29)


at baseline (8/30)








(5) Diabetes mellitus, type II, insulin dependent


Code(s): E11.9 - TYPE 2 DIABETES MELLITUS WITHOUT COMPLICATIONS; Z79.4 - LONG 

TERM (CURRENT) USE OF INSULIN   Status: Chronic   


Plan: 


Continue Glargine 30u HS/Metformin/ISS, ADA, serial accuchecks





Full code


ELOS: 1-2 nights; can go back to nursing home with wound care

## 2020-08-31 LAB
ANION GAP SERPL CALC-SCNC: 17 MMOL/L (ref 10–20)
BUN SERPL-MCNC: 20 MG/DL (ref 9.8–20.1)
CALCIUM SERPL-MCNC: 8.7 MG/DL (ref 7.8–10.44)
CHLORIDE SERPL-SCNC: 102 MMOL/L (ref 98–107)
CO2 SERPL-SCNC: 21 MMOL/L (ref 23–31)
CREAT CL PREDICTED SERPL C-G-VRATE: 58 ML/MIN (ref 70–130)
GLUCOSE SERPL-MCNC: 239 MG/DL (ref 80–115)
INR PPP: 1.9
MAGNESIUM SERPL-MCNC: 1.5 MG/DL (ref 1.6–2.6)
POTASSIUM SERPL-SCNC: 4.1 MMOL/L (ref 3.5–5.1)
PROTHROMBIN TIME: 22 SEC (ref 12–14.7)
SODIUM SERPL-SCNC: 136 MMOL/L (ref 136–145)

## 2020-08-31 RX ADMIN — INSULIN LISPRO SCH UNIT: 100 INJECTION, SOLUTION INTRAVENOUS; SUBCUTANEOUS at 17:05

## 2020-08-31 RX ADMIN — Medication SCH UNITS: at 08:28

## 2020-08-31 RX ADMIN — THERA TABS SCH TAB: TAB at 08:27

## 2020-08-31 RX ADMIN — INSULIN LISPRO SCH UNIT: 100 INJECTION, SOLUTION INTRAVENOUS; SUBCUTANEOUS at 11:18

## 2020-08-31 RX ADMIN — INSULIN LISPRO SCH UNIT: 100 INJECTION, SOLUTION INTRAVENOUS; SUBCUTANEOUS at 08:32

## 2020-08-31 RX ADMIN — INSULIN GLARGINE SCH MLS: 100 INJECTION, SOLUTION SUBCUTANEOUS at 20:44

## 2020-08-31 NOTE — PDOC.HOSPP
- Subjective


Encounter Date: 08/31/20


Encounter Time: 17:30


Subjective: 





No overnight events. Pt reports she feels well. Her right lower extremity pain 

is improved. Denies CP, SOB, abdominal pain. No new neurologic deficits. 





Chart and medications reviewed. 





- Objective


Vital Signs & Weight: 


 Vital Signs (12 hours)











  Temp Pulse Resp BP Pulse Ox


 


 08/31/20 15:00  98.5 F  110 H  17  127/61  97


 


 08/31/20 12:00  98.4 F  104 H  17  130/63  98


 


 08/31/20 07:11  98.6 F  91  20  127/60  96








 Weight











Admit Weight                   215 lb 9.6 oz


 


Weight                         205 lb 4.8 oz














I&O: 


 











 08/30/20 08/31/20 09/01/20





 06:59 06:59 06:59


 


Intake Total 2020 1310 720


 


Output Total 1250 675 600


 


Balance 770 635 120











Result Diagrams: 


 08/28/20 04:21





 08/31/20 04:39


Additional Labs: 


 Accuchecks











  08/31/20 08/31/20 08/31/20





  17:01 11:06 05:36


 


POC Glucose  136 H  146 H  235 H














  08/30/20 08/30/20





  20:50 16:51


 


POC Glucose  190 H  143 H














Hospitalist ROS





- Review of Systems


Constitutional: denies: fever


Eyes: denies: vision change


Respiratory: denies: cough, shortness of breath


Cardiovascular: denies: chest pain, palpitations


Gastrointestinal: denies: nausea, vomiting, abdominal pain


Genitourinary: denies: dysuria


Neurological: denies: weakness





- Medication


Medications: 


Active Medications











Generic Name Dose Route Start Last Admin





  Trade Name Jayshree  PRN Reason Stop Dose Admin


 


Amlodipine Besylate  10 mg  08/28/20 09:00  08/31/20 08:35





  Norvasc  PO   10 mg





  DAILY ANNIE   Administration





     





     





     





     


 


Atorvastatin Calcium  40 mg  08/27/20 21:00  08/30/20 21:17





  Lipitor  PO   40 mg





  HS ANNIE   Administration





     





     





     





     


 


Cholecalciferol  2,000 units  08/28/20 09:00  08/31/20 08:28





  Vitamin D3  PO   2,000 units





  DAILY ANNIE   Administration





     





     





     





     


 


Enoxaparin Sodium  100 mg  08/27/20 09:00  08/31/20 08:32





  Lovenox  SC   100 mg





  0900,2100 ANNIE   Administration





     





     





     





     


 


Ferrous Sulfate  325 mg  08/27/20 21:00  08/31/20 08:35





  Feosol  PO   325 mg





  BID ANNIE   Administration





     





     





     





     


 


Hydralazine HCl  25 mg  08/28/20 09:00  08/31/20 08:40





  Apresoline  PO   Not Given





  DAILY ANNIE   





     





     





     





     


 


Insulin Glargine 20 units/  0.2 mls @ 0 mls/hr  08/29/20 16:31  08/30/20 21:18





  Miscellaneous Medication  SC   0.2 mls





  HS ANNIE   Administration





     





     





     





  As Directed   


 


Insulin Human Lispro  0 units  08/26/20 21:34  08/30/20 06:34





  Humalog  SC   2 unit





  .MILD SLIDING SCALE PRN   Administration





  Mild Correctional Scale   





     





     





     


 


Insulin Human Lispro  0 units  08/26/20 21:34  08/26/20 23:50





  Humalog  SC   4 unit





  .BEDTIME SLIDING SC PRN   Administration





  Bedtime Correctional Scale   





     





     





     


 


Insulin Human Lispro  7 units  08/29/20 17:00  08/31/20 17:05





  Humalog  SC   7 unit





  AC ANNIE   Administration





     





     





     





     


 


Lisinopril  40 mg  08/28/20 09:00  08/31/20 08:40





  Zestril  PO   Not Given





  DAILY ANNIE   





     





     





     





     


 


Magnesium Oxide  400 mg  08/30/20 15:00  08/31/20 15:34





  Magnesium Oxide  PO   400 mg





  TID ANNIE   Administration





     





     





     





     


 


Metformin HCl  500 mg  08/27/20 21:00  08/31/20 08:27





  Glucophage  PO   500 mg





  BID ANNIE   Administration





     





     





     





     


 


Multivitamins  1 tab  08/28/20 09:00  08/31/20 08:27





  Theragran  PO   1 tab





  DAILY ANNIE   Administration





     





     





     





     


 


Pantoprazole Sodium  40 mg  08/27/20 21:00  08/31/20 08:28





  Protonix  PO   40 mg





  BID ANNIE   Administration





     





     





     





     


 


Potassium Chloride  10 meq  08/27/20 10:00  08/31/20 08:30





  Klor-Con 10  PO   10 meq





  Q2DAYS ANNIE   Administration





     





     





     





     


 


Rivastigmine  3 mg  08/27/20 15:00  08/31/20 15:34





  Exelon  PO   3 mg





  TID ANNIE   Administration





     





     





     





     


 


Sodium Chloride  10 ml  08/26/20 21:32  08/30/20 08:30





  Flush - Normal Saline  IVF   10 ml





  Q12HR PRN   Administration





  Saline Flush   





     





     





     


 


Tramadol HCl  50 mg  08/27/20 09:46  08/30/20 06:22





  Ultram  PO   50 mg





  BID PRN   Administration





  Pain   





     





     





     


 


Trazodone HCl  75 mg  08/27/20 21:00  08/30/20 21:22





  Desyrel  PO   75 mg





  HS ANNIE   Administration





     





     





     





     


 


Warfarin Sodium  10 mg  08/27/20 17:00  08/31/20 17:06





  Coumadin  PO   10 mg





  1700 ANNIE   Administration





     





     





     





     














- Exam


General Appearance: NAD, awake alert


Eye: PERRL, anicteric sclera


ENT: normocephalic atraumatic, moist mucosa


Neck: supple, symmetric, no JVD


Heart: RRR, no murmur, no gallops, no rubs


Respiratory: CTAB, no wheezes, no rales, no ronchi, normal chest expansion, no 

tachypnea, normal percussion


Gastrointestinal: soft, non-tender, non-distended, normal bowel sounds, no 

palpable masses, no hepatomegaly, no splenomegaly, no bruit


Extremities: 1+ LE edema


Extremities - other findings: RLE with chronic venous stasis changes, verrucous 

lesions to RLE


Skin: normal turgor


Neurological: no new deficit


Psychiatric - other findings: AOx1





Hosp A/P


(1) Diabetes mellitus, type II, insulin dependent


Code(s): E11.9 - TYPE 2 DIABETES MELLITUS WITHOUT COMPLICATIONS; Z79.4 - LONG 

TERM (CURRENT) USE OF INSULIN   Status: Chronic   





(2) Hyperlipidemia


Code(s): E78.5 - HYPERLIPIDEMIA, UNSPECIFIED   Status: Chronic   





(3) Pulmonary emboli


Code(s): I26.99 - OTHER PULMONARY EMBOLISM WITHOUT ACUTE COR PULMONALE   Status

: Chronic   


Qualifiers: 


   Chronicity: chronic 





(4) Acute kidney injury


Code(s): N17.9 - ACUTE KIDNEY FAILURE, UNSPECIFIED   Status: Acute   





(5) History of CVA (cerebrovascular accident)


Code(s): Z86.73 - PRSNL HX OF TIA (TIA), AND CEREB INFRC W/O RESID DEFICITS   

Status: Chronic   





(6) Hypertension


Code(s): I10 - ESSENTIAL (PRIMARY) HYPERTENSION   Status: Chronic   





(7) Lymphedema


Code(s): I89.0 - LYMPHEDEMA, NOT ELSEWHERE CLASSIFIED   Status: Acute   





- Plan





Verrucous Lymphedema of Extremities:


Pt presented with erythema and swelling to RLE. Initially thought to be 

cellulitus and treated with cefepime/zosyn. No leukocytosis, afebrile. Skin 

with chronic venous stasis changes. No induration. 





-Elevate extremities


-Compression stockings/SCDs





Pulmonary Embolism:


CTA revealed chronic pulmonary embolism. Pt on home warfarin. Subtherapeutic 

INR on admission. Bridging with Lovenox. INR today 1.5.





-Lovenox bridge


-Coumadin, pharmacy to dose


-Daily INRs





Acute on Chronic Kidney Failure


Pt with hx of CKD. Baseline Cr ~1.2. Increasing now to 1.49 this am. Will hold 

home lasix, lisinopril, and metformin. IVF.





-IVF


-Hold lasix, lisinopril, metformin


-Continue to trend kidney function





T2DM:


Insulin dependent. On home metformin. Will hold metformin 2/2 ANAHI.


-Lantus 20 units


-ISS


-Hold home metformin





HTN:


Continue fransico amlodipine, lisinopril, hydralazine, lasix





Hyperlipidemia:


Continue home atorvastatin 40 mg





DVT prophylaxis: on lovenox/coumadin


FULL CODE





Case discussed with attending physician, Dr. Buckley.

## 2020-09-01 LAB
ANION GAP SERPL CALC-SCNC: 15 MMOL/L (ref 10–20)
BASOPHILS # BLD AUTO: 0.1 THOU/UL (ref 0–0.2)
BASOPHILS NFR BLD AUTO: 0.5 % (ref 0–1)
BUN SERPL-MCNC: 19 MG/DL (ref 9.8–20.1)
CALCIUM SERPL-MCNC: 8.9 MG/DL (ref 7.8–10.44)
CHLORIDE SERPL-SCNC: 106 MMOL/L (ref 98–107)
CO2 SERPL-SCNC: 22 MMOL/L (ref 23–31)
CREAT CL PREDICTED SERPL C-G-VRATE: 79 ML/MIN (ref 70–130)
EOSINOPHIL # BLD AUTO: 0.3 THOU/UL (ref 0–0.7)
EOSINOPHIL NFR BLD AUTO: 2.5 % (ref 0–10)
GLUCOSE SERPL-MCNC: 130 MG/DL (ref 80–115)
HGB BLD-MCNC: 10 G/DL (ref 12–16)
INR PPP: 2
LYMPHOCYTES # BLD: 3.3 THOU/UL (ref 1.2–3.4)
LYMPHOCYTES NFR BLD AUTO: 30.2 % (ref 21–51)
MAGNESIUM SERPL-MCNC: 1.6 MG/DL (ref 1.6–2.6)
MCH RBC QN AUTO: 29.4 PG (ref 27–31)
MCV RBC AUTO: 91.2 FL (ref 78–98)
MONOCYTES # BLD AUTO: 0.7 THOU/UL (ref 0.11–0.59)
MONOCYTES NFR BLD AUTO: 6.7 % (ref 0–10)
NEUTROPHILS # BLD AUTO: 6.5 THOU/UL (ref 1.4–6.5)
NEUTROPHILS NFR BLD AUTO: 60 % (ref 42–75)
PLATELET # BLD AUTO: 341 THOU/UL (ref 130–400)
POTASSIUM SERPL-SCNC: 3.8 MMOL/L (ref 3.5–5.1)
PROTHROMBIN TIME: 22.9 SEC (ref 12–14.7)
RBC # BLD AUTO: 3.4 MILL/UL (ref 4.2–5.4)
SODIUM SERPL-SCNC: 139 MMOL/L (ref 136–145)
WBC # BLD AUTO: 10.8 THOU/UL (ref 4.8–10.8)

## 2020-09-01 RX ADMIN — INSULIN GLARGINE SCH MLS: 100 INJECTION, SOLUTION SUBCUTANEOUS at 20:32

## 2020-09-01 RX ADMIN — INSULIN LISPRO SCH UNIT: 100 INJECTION, SOLUTION INTRAVENOUS; SUBCUTANEOUS at 08:13

## 2020-09-01 RX ADMIN — INSULIN LISPRO SCH UNIT: 100 INJECTION, SOLUTION INTRAVENOUS; SUBCUTANEOUS at 18:03

## 2020-09-01 RX ADMIN — INSULIN LISPRO SCH UNIT: 100 INJECTION, SOLUTION INTRAVENOUS; SUBCUTANEOUS at 12:12

## 2020-09-01 RX ADMIN — Medication SCH UNITS: at 08:12

## 2020-09-01 RX ADMIN — THERA TABS SCH TAB: TAB at 08:11

## 2020-09-02 VITALS — TEMPERATURE: 99.1 F

## 2020-09-02 VITALS — DIASTOLIC BLOOD PRESSURE: 76 MMHG | SYSTOLIC BLOOD PRESSURE: 136 MMHG

## 2020-09-02 LAB
INR PPP: 2.4
PROTHROMBIN TIME: 26 SEC (ref 12–14.7)

## 2020-09-02 RX ADMIN — INSULIN LISPRO SCH UNIT: 100 INJECTION, SOLUTION INTRAVENOUS; SUBCUTANEOUS at 08:22

## 2020-09-02 RX ADMIN — Medication SCH UNITS: at 08:20

## 2020-09-02 RX ADMIN — INSULIN LISPRO SCH UNIT: 100 INJECTION, SOLUTION INTRAVENOUS; SUBCUTANEOUS at 12:00

## 2020-09-02 RX ADMIN — INSULIN LISPRO PRN UNIT: 100 INJECTION, SOLUTION INTRAVENOUS; SUBCUTANEOUS at 12:01

## 2020-09-02 RX ADMIN — THERA TABS SCH TAB: TAB at 08:21

## 2020-09-02 RX ADMIN — INSULIN LISPRO PRN UNIT: 100 INJECTION, SOLUTION INTRAVENOUS; SUBCUTANEOUS at 06:18

## 2020-09-02 NOTE — DIS
DATE OF ADMISSION:  2020



DATE OF DISCHARGE:  2020





MEDICATIONS:  Reconciled at discharge.  All home medications were continued

includin. Potassium chloride 10 mEq p.o. every 2 days.

2. Amlodipine 10 mg daily.

3. Arginaid powder 1 packet daily.

4. Atorvastatin 40 mg p.o. at bedtime.

5. Vitamin D3 of 2000 units p.o. daily.

6. Dextrose gel 15 g p.o. q.i.d. p.r.n. hypoglycemia.

7. Hydralazine 25 mg daily.

8. Lantus SoloStar 30 units SQ at bedtime.

9. Lisinopril 40 mg p.o. daily.

10. Metformin 500 mg p.o. b.i.d.

11. Multivitamin one tablet daily.

12. Exelon 3 mg p.o. t.i.d.

13. Trazodone 75 mg p.o. at bedtime.

14. Ferrous sulfate 325 mg p.o. b.i.d.

15. Magnesium oxide 400 mg p.o. b.i.d.

16. Pantoprazole 40 mg p.o. b.i.d.

17. Warfarin 4 mg on , 6 mg p.o. daily on all other days of the week.

18. Acetaminophen 1000 mg q.6 hours p.r.n. pain.

19. Zofran 4 mg q.6 hours p.r.n. nausea.

20. Magnesium hydroxide 30 mL p.o. q.24 p.r.n. constipation.

21. Glucagon 1 mg IM one p.r.n. hypoglycemia.

22. Loperamide 2 mg p.o. q.6 p.r.n. diarrhea.

23. Tramadol 50 mg p.o. b.i.d. p.r.n. pain.

24. Humalog sliding scale.



FINAL DIAGNOSES AT DISCHARGE:  

1. Verrucous lymphedema.

2. Hypertension.

3. Hyperlipidemia.

4. Type 2 diabetes mellitus.

5. Chronic pulmonary embolism.

6. 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



HISTORY OF PRESENT ILLNESS:  The patient was brought in from Nursing Home

with chief complaints of right lower extremity pain and swelling, lymphedema at

baseline who developed erythema and discomfort in the right lower extremity on 
the

day of presentation.  She is bedbound due to history of stroke with right-sided

deficit, and is A and O x1 at baseline, unable to obtain mental history from the

patient due to her mental status.  In the emergency department, the patient

underwent an EKG, which showed sinus tachycardia with a heart rate of 109.  Due 
to

difficulty obtaining IV access and central line was placed in the ED, she was

started on IV antibiotics, cefepime and vancomycin.  Laboratory studies showed 
her

INR was subtherapeutic and therefore, she was given Lovenox.  D-Dimer was 
elevated;

therefore, the patient underwent CT angiogram and was given full-strength 
Lovenox to

cover her possible PE.  She is premedicated for possible iodine contrast 
including

Benadryl, Solu-Medrol, and Pepcid.  She is admitted for cellulitis and the 
underwent

a venous Doppler in the ED that was negative for DVT and received IV fluids. 



HOSPITAL COURSE:  Ms. Núñez is a 64-year-old female with past medical history 
of CVA

with residual right-sided deficits, type 2 diabetes mellitus, iron-deficiency

anemia, lymphedema, hypertension, hyperlipidemia, and chronic anticoagulation, 
who

presented to the ED on 2020 from nursing home for right lower extremity 
pain

and swelling.  The patient is A&Ox1 at baseline and bedbound.  In the ER, workup

revealed that her INR was subtherapeutic.  D-Dimer elevated.  CTA performed 
which

showed chronic pulmonary embolism.  The patient was restarted on coumadin, 
bridged with

Lovenox while INR is subtherapeutic.  The patient was also started on Cefepime 
and

vancomycin for presumed cellulitis.  Venous Doppler was negative for DVT. While

initially it thought to be cellulitis,  the patient's right lower extremity 
pain and

swelling were consistent with chronic venous stasis changes and verrucous 
lymphedema.

 AMI hose and SCD boots encouraged.  The patient also developed mild ANAHI with a 
rise

in her creatinine to 1.49, this improved down to 1 after gentle IV fluids.  Once

therapeutic on warfarin, the patient was determined to be medically stable for

discharge back to her nursing facility.  The patient was resumed on her prior

regimen of Warfarin with 4 mg on  and 6 mg on all other days of the 
week.

The patient will need close followup for monitoring of her INRs with the 
C[facility]mad[facility]

Clinic. 



LABS AND KEY FINDINGS:  CBC; 10.8, 3.4, 10.0, 31.0, platelets 341.  Chemistry;

sodium 139, potassium 3.8, chloride 106, CO2 of 22, BUN 19, creatinine 10.09,

glucose 130, calcium 8.9, magnesium 1.6.  TSH 2.88.  AST 14, ALT 9, alkaline

phosphatase 99.  Albumin 3.3.  Troponin 0.025.  Urinalysis grossly negative.

COVID-19 negative.  D-Dimer 6.57.  INR on presentation 0.08, INR at day of 
discharge

2.0. 



IMAGING:  Vascular ultrasound done 2020 showed no evidence of DVT.  CTA 
done

on 2020 showed bilateral segmental and subsegmental pulmonary webs and

peripheral eccentric thrombi, likely chronic embolism.  No evidence of right 
heart

strain. 



DIET:  The patient may resume warfarin prudent diet, carbohydrate consistent 
diet,

and heart healthy diet. 



ACTIVITY:  Activity as tolerated and the patient is bedbound.



CODE STATUS:  Full code.



DISCHARGE DISPOSITION:  Back to nursing home.



FOLLOWUP:  The patient will follow up with [facility] in approximately 3 
days

after discharge and with primary care physician 1 week after discharge. 



TIME SPENT:  Time spent preparing discharge paperwork less than 30 minutes.  
Case

discussed with attending physician, Dr. Buckley. 







Job ID:  [id number]



[facility]

## 2020-09-04 NOTE — PQF
CLINICAL DOCUMENTATION CLARIFICATION FORM:



Dear : Jovani Buckley MD          Date / Time: 09/04/2020

Please exercise your independent, professional judgment in responding to the 
clarification form. 

Clinical indicators are provided on the bottom of this form for your review



Please check appropriate box(es) to clarify if the following diagnosis has been 
ruled in our ruled out:



[  ] Ruled in Sepsis

     [  ] Continue to treat        [  ] Resolved

[ x ] Ruled out Sepsis

[  ] Improving

[  ] Cannot rule out diagnosis

[  ] Other diagnosis ___________(Please specify if any)

[  ] Unable to determine



Physician Signature:                         Date/Time:



For continuity of documentation, please document condition throughout progress 
notes and discharge summary.  Thank You.





To be completed by CDI/Coding staff for physician review: 







 Present Clinical Indicators - Signs / Symptoms / Labs Results and Location in 
Medical Record

 

[  x]

 Sepsis ED provider report 08/26

 

[ x ] She was admitted for sepsis secondary to cellulitits H&P on 08/26

 

[  x] EKG showed tachycardia with heart rate of 109. H&P on 08/26

 

[  x] , Temp 99.4, WBC  11.8 

 

Present Risk Factors                                                           
               Results and Location in Medical Record

 

[  x] cellulitis H&P on 08/26

 

[  ]  

 

[  ]  

 

[  ]  

 

Present Treatments                                                             
                 Results and Location in Medical Record

 

[ x ] Vancomycin 1.5 gm IV  Medications on 08/26 to 08/30

 

[  ]  

 

[  ]  

 

[  ]  





CDS/ Signature: KR Phone #: _____________        Date/Time: 09/04/2020



 This is a permanent part of the Medical Record
[facility]

## 2022-06-09 NOTE — RAD
SINGLE VIEW OF THE CHEST:

 

Comparison: 5-

 

History: Intubated patient with respiratory failure. 

 

FINDINGS: 

Single view of the chest shows an enlarged cardiomediastinal silhouette. There is an endotracheal tub
e with its tip in the lower border of the clavicles. NG courses off the anterior aspect of the film. 
There is symmetric volume loss in the left thorax compared to the prior radiograph from three days ag
o. There appears to be a moderate left pleural effusion with adjacent atelectasis. No right sided inf
iltrates are seen. 

 

IMPRESSION: 

1. Appropriate position of endotracheal tube and NG tube. 

2. Left pleural effusion with adjacent atelectasis.

 

 

POS: EAA Banner Transposition Flap Text: The defect edges were debeveled with a #15 scalpel blade.  Given the location of the defect and the proximity to free margins a Banner transposition flap was deemed most appropriate.  Using a sterile surgical marker, an appropriate flap drawn around the defect. The area thus outlined was incised deep to adipose tissue with a #15 scalpel blade.  The skin margins were undermined to an appropriate distance in all directions utilizing iris scissors.

## 2022-07-15 ENCOUNTER — HOSPITAL ENCOUNTER (INPATIENT)
Dept: HOSPITAL 92 - CSHERS | Age: 67
LOS: 3 days | Discharge: NURSING, REHAB OR LONG-TERM CARE | DRG: 683 | End: 2022-07-18
Attending: INTERNAL MEDICINE | Admitting: STUDENT IN AN ORGANIZED HEALTH CARE EDUCATION/TRAINING PROGRAM
Payer: MEDICARE

## 2022-07-15 VITALS — BODY MASS INDEX: 44.2 KG/M2

## 2022-07-15 DIAGNOSIS — E78.5: ICD-10-CM

## 2022-07-15 DIAGNOSIS — Z88.5: ICD-10-CM

## 2022-07-15 DIAGNOSIS — N18.4: ICD-10-CM

## 2022-07-15 DIAGNOSIS — E83.42: ICD-10-CM

## 2022-07-15 DIAGNOSIS — Z88.2: ICD-10-CM

## 2022-07-15 DIAGNOSIS — D63.1: ICD-10-CM

## 2022-07-15 DIAGNOSIS — Z79.899: ICD-10-CM

## 2022-07-15 DIAGNOSIS — Z20.822: ICD-10-CM

## 2022-07-15 DIAGNOSIS — F03.90: ICD-10-CM

## 2022-07-15 DIAGNOSIS — Z79.4: ICD-10-CM

## 2022-07-15 DIAGNOSIS — F41.9: ICD-10-CM

## 2022-07-15 DIAGNOSIS — E87.5: ICD-10-CM

## 2022-07-15 DIAGNOSIS — I12.9: ICD-10-CM

## 2022-07-15 DIAGNOSIS — Z79.84: ICD-10-CM

## 2022-07-15 DIAGNOSIS — I69.398: ICD-10-CM

## 2022-07-15 DIAGNOSIS — J45.909: ICD-10-CM

## 2022-07-15 DIAGNOSIS — E11.51: ICD-10-CM

## 2022-07-15 DIAGNOSIS — N17.9: Primary | ICD-10-CM

## 2022-07-15 DIAGNOSIS — E11.22: ICD-10-CM

## 2022-07-15 DIAGNOSIS — I89.0: ICD-10-CM

## 2022-07-15 DIAGNOSIS — D72.829: ICD-10-CM

## 2022-07-15 DIAGNOSIS — E66.9: ICD-10-CM

## 2022-07-15 LAB
ALBUMIN SERPL BCG-MCNC: 4.3 G/DL (ref 3.4–4.8)
ALP SERPL-CCNC: 138 U/L (ref 40–110)
ALT SERPL W P-5'-P-CCNC: 23 U/L (ref 8–55)
ANALYZER IN CARDIO: (no result)
ANION GAP SERPL CALC-SCNC: 15 MMOL/L (ref 10–20)
ANION GAP SERPL CALC-SCNC: 18 MMOL/L (ref 10–20)
AST SERPL-CCNC: 31 U/L (ref 5–34)
BASE EXCESS STD BLDA CALC-SCNC: -4.6 MEQ/L
BASOPHILS # BLD AUTO: 0 10X3/UL (ref 0–0.2)
BASOPHILS NFR BLD AUTO: 0.2 % (ref 0–2)
BILIRUB SERPL-MCNC: 0.3 MG/DL (ref 0.2–1.2)
BUN SERPL-MCNC: 68 MG/DL (ref 9.8–20.1)
BUN SERPL-MCNC: 72 MG/DL (ref 9.8–20.1)
CA-I BLDA-SCNC: 1.26 MMOL/L (ref 1.12–1.3)
CALCIUM SERPL-MCNC: 9.2 MG/DL (ref 7.8–10.44)
CALCIUM SERPL-MCNC: 9.6 MG/DL (ref 7.8–10.44)
CHLORIDE SERPL-SCNC: 103 MMOL/L (ref 98–107)
CHLORIDE SERPL-SCNC: 107 MMOL/L (ref 98–107)
CO2 SERPL-SCNC: 24 MMOL/L (ref 23–31)
CO2 SERPL-SCNC: 25 MMOL/L (ref 23–31)
CREAT CL PREDICTED SERPL C-G-VRATE: 0 ML/MIN (ref 70–130)
CREAT CL PREDICTED SERPL C-G-VRATE: 0 ML/MIN (ref 70–130)
EOSINOPHIL # BLD AUTO: 0.2 10X3/UL (ref 0–0.5)
EOSINOPHIL NFR BLD AUTO: 1.6 % (ref 0–6)
GLOBULIN SER CALC-MCNC: 3.3 G/DL (ref 2.4–3.5)
GLUCOSE SERPL-MCNC: 156 MG/DL (ref 80–115)
GLUCOSE SERPL-MCNC: 233 MG/DL (ref 80–115)
HCO3 BLDA-SCNC: 20.6 MEQ/L (ref 22–28)
HCT VFR BLDA CALC: 30 % (ref 36–47)
HGB BLD-MCNC: 10.8 G/DL (ref 12–15.5)
HGB BLDA-MCNC: 10.3 G/DL (ref 12–16)
INR PPP: 1
LYMPHOCYTES NFR BLD AUTO: 29.1 % (ref 18–47)
MAGNESIUM SERPL-MCNC: 1.3 MG/DL (ref 1.6–2.6)
MAGNESIUM SERPL-MCNC: 1.4 MG/DL (ref 1.6–2.6)
MCH RBC QN AUTO: 31.6 PG (ref 27–33)
MCV RBC AUTO: 96.5 FL (ref 81.6–98.3)
MONOCYTES # BLD AUTO: 0.9 10X3/UL (ref 0–1.1)
MONOCYTES NFR BLD AUTO: 7.5 % (ref 0–10)
NEUTROPHILS # BLD AUTO: 7 10X3/UL (ref 1.5–8.4)
NEUTROPHILS NFR BLD AUTO: 60.9 % (ref 40–75)
O2 A-A PPRESDIFF RESPIRATORY: 28 MMHG (ref 0–20)
PCO2 BLDA: 38.5 MMHG (ref 35–45)
PH BLDA: 7.35 [PH] (ref 7.35–7.45)
PLATELET # BLD AUTO: 257 10X3/UL (ref 150–450)
PO2 BLDA: 73.6 MMHG (ref 80–?)
POTASSIUM BLD-SCNC: 6.3 MMOL/L (ref 3.7–5.3)
POTASSIUM SERPL-SCNC: 5.9 MMOL/L (ref 3.5–5.1)
POTASSIUM SERPL-SCNC: 7.3 MMOL/L (ref 3.5–5.1)
PROTHROMBIN TIME: 10.4 SEC (ref 9.5–12.1)
RBC # BLD AUTO: 3.42 10X6/UL (ref 3.9–5.03)
SODIUM SERPL-SCNC: 138 MMOL/L (ref 136–145)
SODIUM SERPL-SCNC: 141 MMOL/L (ref 136–145)
SPECIMEN DRAWN FROM PATIENT: (no result)
WBC # BLD AUTO: 11.5 10X3/UL (ref 3.5–10.5)

## 2022-07-15 PROCEDURE — 85025 COMPLETE CBC W/AUTO DIFF WBC: CPT

## 2022-07-15 PROCEDURE — 80053 COMPREHEN METABOLIC PANEL: CPT

## 2022-07-15 PROCEDURE — U0002 COVID-19 LAB TEST NON-CDC: HCPCS

## 2022-07-15 PROCEDURE — 36600 WITHDRAWAL OF ARTERIAL BLOOD: CPT

## 2022-07-15 PROCEDURE — 36416 COLLJ CAPILLARY BLOOD SPEC: CPT

## 2022-07-15 PROCEDURE — 97139 UNLISTED THERAPEUTIC PX: CPT

## 2022-07-15 PROCEDURE — 80048 BASIC METABOLIC PNL TOTAL CA: CPT

## 2022-07-15 PROCEDURE — 96374 THER/PROPH/DIAG INJ IV PUSH: CPT

## 2022-07-15 PROCEDURE — 82805 BLOOD GASES W/O2 SATURATION: CPT

## 2022-07-15 PROCEDURE — 83036 HEMOGLOBIN GLYCOSYLATED A1C: CPT

## 2022-07-15 PROCEDURE — 96361 HYDRATE IV INFUSION ADD-ON: CPT

## 2022-07-15 PROCEDURE — 83735 ASSAY OF MAGNESIUM: CPT

## 2022-07-15 PROCEDURE — 85610 PROTHROMBIN TIME: CPT

## 2022-07-15 PROCEDURE — 93005 ELECTROCARDIOGRAM TRACING: CPT

## 2022-07-15 PROCEDURE — 96375 TX/PRO/DX INJ NEW DRUG ADDON: CPT

## 2022-07-15 PROCEDURE — 36415 COLL VENOUS BLD VENIPUNCTURE: CPT

## 2022-07-16 LAB
ANION GAP SERPL CALC-SCNC: 16 MMOL/L (ref 10–20)
ANION GAP SERPL CALC-SCNC: 16 MMOL/L (ref 10–20)
BASOPHILS # BLD AUTO: 0 10X3/UL (ref 0–0.2)
BASOPHILS NFR BLD AUTO: 0.2 % (ref 0–2)
BUN SERPL-MCNC: 60 MG/DL (ref 9.8–20.1)
BUN SERPL-MCNC: 64 MG/DL (ref 9.8–20.1)
CALCIUM SERPL-MCNC: 8.9 MG/DL (ref 7.8–10.44)
CALCIUM SERPL-MCNC: 9.1 MG/DL (ref 7.8–10.44)
CHLORIDE SERPL-SCNC: 106 MMOL/L (ref 98–107)
CHLORIDE SERPL-SCNC: 108 MMOL/L (ref 98–107)
CO2 SERPL-SCNC: 24 MMOL/L (ref 23–31)
CO2 SERPL-SCNC: 25 MMOL/L (ref 23–31)
CREAT CL PREDICTED SERPL C-G-VRATE: 0 ML/MIN (ref 70–130)
CREAT CL PREDICTED SERPL C-G-VRATE: 43 ML/MIN (ref 70–130)
EOSINOPHIL # BLD AUTO: 0.2 10X3/UL (ref 0–0.5)
EOSINOPHIL NFR BLD AUTO: 1.9 % (ref 0–6)
GLUCOSE SERPL-MCNC: 110 MG/DL (ref 80–115)
GLUCOSE SERPL-MCNC: 171 MG/DL (ref 80–115)
HGB BLD-MCNC: 10.1 G/DL (ref 12–15.5)
LYMPHOCYTES NFR BLD AUTO: 32.3 % (ref 18–47)
MAGNESIUM SERPL-MCNC: 1.2 MG/DL (ref 1.6–2.6)
MCH RBC QN AUTO: 31.1 PG (ref 27–33)
MCV RBC AUTO: 94.2 FL (ref 81.6–98.3)
MONOCYTES # BLD AUTO: 0.8 10X3/UL (ref 0–1.1)
MONOCYTES NFR BLD AUTO: 8.1 % (ref 0–10)
NEUTROPHILS # BLD AUTO: 5.6 10X3/UL (ref 1.5–8.4)
NEUTROPHILS NFR BLD AUTO: 56.9 % (ref 40–75)
PLATELET # BLD AUTO: 215 10X3/UL (ref 150–450)
POTASSIUM SERPL-SCNC: 5.5 MMOL/L (ref 3.5–5.1)
POTASSIUM SERPL-SCNC: 6 MMOL/L (ref 3.5–5.1)
RBC # BLD AUTO: 3.25 10X6/UL (ref 3.9–5.03)
SODIUM SERPL-SCNC: 141 MMOL/L (ref 136–145)
SODIUM SERPL-SCNC: 142 MMOL/L (ref 136–145)
WBC # BLD AUTO: 9.8 10X3/UL (ref 3.5–10.5)

## 2022-07-16 RX ADMIN — INSULIN LISPRO PRN UNIT: 100 INJECTION, SOLUTION INTRAVENOUS; SUBCUTANEOUS at 21:22

## 2022-07-16 RX ADMIN — SODIUM ZIRCONIUM CYCLOSILICATE SCH GM: 10 POWDER, FOR SUSPENSION ORAL at 21:24

## 2022-07-16 RX ADMIN — INSULIN LISPRO PRN UNIT: 100 INJECTION, SOLUTION INTRAVENOUS; SUBCUTANEOUS at 16:55

## 2022-07-16 RX ADMIN — SODIUM ZIRCONIUM CYCLOSILICATE SCH GM: 10 POWDER, FOR SUSPENSION ORAL at 16:54

## 2022-07-16 RX ADMIN — HEPARIN SODIUM SCH UNITS: 5000 INJECTION, SOLUTION INTRAVENOUS; SUBCUTANEOUS at 17:30

## 2022-07-16 RX ADMIN — MAGNESIUM SULFATE HEPTAHYDRATE SCH: 40 INJECTION, SOLUTION INTRAVENOUS at 11:36

## 2022-07-16 RX ADMIN — INSULIN LISPRO PRN UNIT: 100 INJECTION, SOLUTION INTRAVENOUS; SUBCUTANEOUS at 11:39

## 2022-07-16 RX ADMIN — MAGNESIUM SULFATE HEPTAHYDRATE SCH MLS: 40 INJECTION, SOLUTION INTRAVENOUS at 07:53

## 2022-07-16 RX ADMIN — HEPARIN SODIUM SCH UNITS: 5000 INJECTION, SOLUTION INTRAVENOUS; SUBCUTANEOUS at 21:24

## 2022-07-17 LAB
ANION GAP SERPL CALC-SCNC: 19 MMOL/L (ref 10–20)
BUN SERPL-MCNC: 51 MG/DL (ref 9.8–20.1)
CALCIUM SERPL-MCNC: 8.7 MG/DL (ref 7.8–10.44)
CHLORIDE SERPL-SCNC: 103 MMOL/L (ref 98–107)
CO2 SERPL-SCNC: 25 MMOL/L (ref 23–31)
CREAT CL PREDICTED SERPL C-G-VRATE: 47 ML/MIN (ref 70–130)
GLUCOSE SERPL-MCNC: 191 MG/DL (ref 80–115)
MAGNESIUM SERPL-MCNC: 2.5 MG/DL (ref 1.6–2.6)
POTASSIUM SERPL-SCNC: 5.5 MMOL/L (ref 3.5–5.1)
SODIUM SERPL-SCNC: 141 MMOL/L (ref 136–145)

## 2022-07-17 RX ADMIN — SODIUM ZIRCONIUM CYCLOSILICATE SCH GM: 10 POWDER, FOR SUSPENSION ORAL at 16:49

## 2022-07-17 RX ADMIN — SODIUM ZIRCONIUM CYCLOSILICATE SCH GM: 10 POWDER, FOR SUSPENSION ORAL at 20:47

## 2022-07-17 RX ADMIN — INSULIN LISPRO PRN UNIT: 100 INJECTION, SOLUTION INTRAVENOUS; SUBCUTANEOUS at 06:32

## 2022-07-17 RX ADMIN — SODIUM ZIRCONIUM CYCLOSILICATE SCH GM: 10 POWDER, FOR SUSPENSION ORAL at 09:42

## 2022-07-17 RX ADMIN — INSULIN LISPRO PRN UNIT: 100 INJECTION, SOLUTION INTRAVENOUS; SUBCUTANEOUS at 16:49

## 2022-07-17 RX ADMIN — INSULIN LISPRO PRN UNIT: 100 INJECTION, SOLUTION INTRAVENOUS; SUBCUTANEOUS at 20:54

## 2022-07-17 RX ADMIN — INSULIN LISPRO PRN UNIT: 100 INJECTION, SOLUTION INTRAVENOUS; SUBCUTANEOUS at 12:34

## 2022-07-18 VITALS — DIASTOLIC BLOOD PRESSURE: 71 MMHG | TEMPERATURE: 99 F | SYSTOLIC BLOOD PRESSURE: 166 MMHG

## 2022-07-18 LAB
ANION GAP SERPL CALC-SCNC: 18 MMOL/L (ref 10–20)
BUN SERPL-MCNC: 45 MG/DL (ref 9.8–20.1)
CALCIUM SERPL-MCNC: 8.5 MG/DL (ref 7.8–10.44)
CHLORIDE SERPL-SCNC: 100 MMOL/L (ref 98–107)
CO2 SERPL-SCNC: 27 MMOL/L (ref 23–31)
CREAT CL PREDICTED SERPL C-G-VRATE: 49 ML/MIN (ref 70–130)
GLUCOSE SERPL-MCNC: 292 MG/DL (ref 80–115)
POTASSIUM SERPL-SCNC: 4.9 MMOL/L (ref 3.5–5.1)
SODIUM SERPL-SCNC: 140 MMOL/L (ref 136–145)

## 2022-07-18 RX ADMIN — SODIUM ZIRCONIUM CYCLOSILICATE SCH GM: 10 POWDER, FOR SUSPENSION ORAL at 15:12

## 2022-07-18 RX ADMIN — INSULIN LISPRO PRN UNIT: 100 INJECTION, SOLUTION INTRAVENOUS; SUBCUTANEOUS at 05:29

## 2022-07-18 RX ADMIN — SODIUM ZIRCONIUM CYCLOSILICATE SCH GM: 10 POWDER, FOR SUSPENSION ORAL at 08:24

## 2022-07-18 RX ADMIN — INSULIN LISPRO PRN UNIT: 100 INJECTION, SOLUTION INTRAVENOUS; SUBCUTANEOUS at 12:30

## 2022-07-18 RX ADMIN — INSULIN LISPRO PRN UNIT: 100 INJECTION, SOLUTION INTRAVENOUS; SUBCUTANEOUS at 16:40

## 2023-05-05 ENCOUNTER — HOSPITAL ENCOUNTER (EMERGENCY)
Dept: HOSPITAL 92 - CSHERS | Age: 68
Discharge: HOME | End: 2023-05-05
Payer: MEDICARE

## 2023-05-05 DIAGNOSIS — Z79.899: ICD-10-CM

## 2023-05-05 DIAGNOSIS — Z79.4: ICD-10-CM

## 2023-05-05 DIAGNOSIS — Z86.73: ICD-10-CM
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
## 2023-05-05 DIAGNOSIS — G47.00: ICD-10-CM

## 2023-05-05 DIAGNOSIS — E11.9: ICD-10-CM

## 2023-05-05 DIAGNOSIS — Z79.01: ICD-10-CM

## 2023-05-05 DIAGNOSIS — D50.9: ICD-10-CM

## 2023-05-05 DIAGNOSIS — J45.909: ICD-10-CM

## 2023-05-05 DIAGNOSIS — I10: ICD-10-CM

## 2023-05-05 DIAGNOSIS — S81.811A: Primary | ICD-10-CM

## 2023-05-05 DIAGNOSIS — Z23: ICD-10-CM
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
## 2023-05-05 DIAGNOSIS — E66.9: ICD-10-CM

## 2023-05-05 DIAGNOSIS — I73.9: ICD-10-CM

## 2023-05-05 PROCEDURE — 90471 IMMUNIZATION ADMIN: CPT

## 2023-05-05 PROCEDURE — 90715 TDAP VACCINE 7 YRS/> IM: CPT

## 2023-05-05 PROCEDURE — 12002 RPR S/N/AX/GEN/TRNK2.6-7.5CM: CPT

## 2023-08-10 ENCOUNTER — HOSPITAL ENCOUNTER (EMERGENCY)
Dept: HOSPITAL 92 - CSHERS | Age: 68
Discharge: NURSING, REHAB OR LONG-TERM CARE | End: 2023-08-10
Payer: MEDICARE

## 2023-08-10 DIAGNOSIS — I10: ICD-10-CM

## 2023-08-10 DIAGNOSIS — R10.84: ICD-10-CM

## 2023-08-10 DIAGNOSIS — R07.9: Primary | ICD-10-CM

## 2023-08-10 DIAGNOSIS — E11.9: ICD-10-CM

## 2023-08-10 LAB
ALBUMIN SERPL BCG-MCNC: 2.6 G/DL (ref 3.4–4.8)
ALP SERPL-CCNC: 154 U/L (ref 40–110)
ALT SERPL W P-5'-P-CCNC: 17 U/L (ref 8–55)
ANION GAP SERPL CALC-SCNC: 17 MMOL/L (ref 10–20)
AST SERPL-CCNC: 20 U/L (ref 5–34)
BASOPHILS # BLD AUTO: 0 10X3/UL (ref 0–0.2)
BASOPHILS NFR BLD AUTO: 0.2 % (ref 0–2)
BILIRUB SERPL-MCNC: 0.2 MG/DL (ref 0.2–1.2)
BUN SERPL-MCNC: 20 MG/DL (ref 9.8–20.1)
CALCIUM SERPL-MCNC: 7.5 MG/DL (ref 7.8–10.44)
CHLORIDE SERPL-SCNC: 103 MMOL/L (ref 98–107)
CO2 SERPL-SCNC: 27 MMOL/L (ref 23–31)
CREAT CL PREDICTED SERPL C-G-VRATE: 0 ML/MIN (ref 70–130)
EOSINOPHIL # BLD AUTO: 0.1 10X3/UL (ref 0–0.5)
EOSINOPHIL NFR BLD AUTO: 1.2 % (ref 0–6)
GLOBULIN SER CALC-MCNC: 2.4 G/DL (ref 2.4–3.5)
GLUCOSE SERPL-MCNC: 208 MG/DL (ref 80–115)
HCT VFR BLD CALC: 41.5 % (ref 34.9–44.5)
HGB BLD-MCNC: 13.2 G/DL (ref 12–15.5)
LIPASE SERPL-CCNC: 26 U/L (ref 8–78)
LYMPHOCYTES NFR BLD AUTO: 18.2 % (ref 18–47)
MCH RBC QN AUTO: 30.4 PG (ref 27–33)
MCV RBC AUTO: 95.6 FL (ref 81.6–98.3)
MONOCYTES # BLD AUTO: 0.6 10X3/UL (ref 0–1.1)
MONOCYTES NFR BLD AUTO: 5 % (ref 0–10)
NEUTROPHILS # BLD AUTO: 9.1 10X3/UL (ref 1.5–8.4)
NEUTROPHILS NFR BLD AUTO: 75 % (ref 40–75)
PLATELET # BLD AUTO: 307 10X3/UL (ref 150–450)
POTASSIUM SERPL-SCNC: 3.2 MMOL/L (ref 3.5–5.1)
RBC # BLD AUTO: 4.34 10X6/UL (ref 3.9–5.03)
SODIUM SERPL-SCNC: 144 MMOL/L (ref 136–145)
TROPONIN I SERPL DL<=0.01 NG/ML-MCNC: 0.01 NG/ML (ref ?–0.03)
WBC # BLD AUTO: 12.1 10X3/UL (ref 3.5–10.5)

## 2023-08-10 PROCEDURE — 83690 ASSAY OF LIPASE: CPT

## 2023-08-10 PROCEDURE — 71045 X-RAY EXAM CHEST 1 VIEW: CPT

## 2023-08-10 PROCEDURE — 74176 CT ABD & PELVIS W/O CONTRAST: CPT

## 2023-08-10 PROCEDURE — 83605 ASSAY OF LACTIC ACID: CPT

## 2023-08-10 PROCEDURE — 80053 COMPREHEN METABOLIC PANEL: CPT

## 2023-08-10 PROCEDURE — 85025 COMPLETE CBC W/AUTO DIFF WBC: CPT

## 2023-08-10 PROCEDURE — 93005 ELECTROCARDIOGRAM TRACING: CPT

## 2023-08-10 PROCEDURE — 84484 ASSAY OF TROPONIN QUANT: CPT

## 2023-11-12 ENCOUNTER — HOSPITAL ENCOUNTER (INPATIENT)
Dept: HOSPITAL 92 - CSHERS | Age: 68
LOS: 7 days | Discharge: HOSPICE | DRG: 177 | End: 2023-11-19
Attending: INTERNAL MEDICINE | Admitting: INTERNAL MEDICINE
Payer: MEDICARE

## 2023-11-12 VITALS — BODY MASS INDEX: 41.3 KG/M2

## 2023-11-12 DIAGNOSIS — J69.0: Primary | ICD-10-CM

## 2023-11-12 DIAGNOSIS — J30.9: ICD-10-CM

## 2023-11-12 DIAGNOSIS — I69.320: ICD-10-CM

## 2023-11-12 DIAGNOSIS — E87.6: ICD-10-CM

## 2023-11-12 DIAGNOSIS — E78.5: ICD-10-CM

## 2023-11-12 DIAGNOSIS — I69.391: ICD-10-CM

## 2023-11-12 DIAGNOSIS — I13.0: ICD-10-CM

## 2023-11-12 DIAGNOSIS — E88.09: ICD-10-CM

## 2023-11-12 DIAGNOSIS — Z11.52: ICD-10-CM

## 2023-11-12 DIAGNOSIS — J96.01: ICD-10-CM

## 2023-11-12 DIAGNOSIS — Z79.899: ICD-10-CM

## 2023-11-12 DIAGNOSIS — I50.9: ICD-10-CM

## 2023-11-12 DIAGNOSIS — I89.0: ICD-10-CM

## 2023-11-12 DIAGNOSIS — N17.9: ICD-10-CM

## 2023-11-12 DIAGNOSIS — D50.9: ICD-10-CM

## 2023-11-12 DIAGNOSIS — Z87.81: ICD-10-CM

## 2023-11-12 DIAGNOSIS — E11.51: ICD-10-CM

## 2023-11-12 DIAGNOSIS — D63.1: ICD-10-CM

## 2023-11-12 DIAGNOSIS — N18.4: ICD-10-CM

## 2023-11-12 DIAGNOSIS — L03.115: ICD-10-CM

## 2023-11-12 DIAGNOSIS — I16.1: ICD-10-CM

## 2023-11-12 DIAGNOSIS — I69.351: ICD-10-CM

## 2023-11-12 DIAGNOSIS — Z79.4: ICD-10-CM

## 2023-11-12 DIAGNOSIS — E11.22: ICD-10-CM

## 2023-11-12 DIAGNOSIS — I95.9: ICD-10-CM

## 2023-11-12 DIAGNOSIS — Z88.5: ICD-10-CM

## 2023-11-12 DIAGNOSIS — F03.90: ICD-10-CM

## 2023-11-12 DIAGNOSIS — E66.01: ICD-10-CM

## 2023-11-12 DIAGNOSIS — R13.10: ICD-10-CM

## 2023-11-12 DIAGNOSIS — Z66: ICD-10-CM

## 2023-11-12 LAB
ALBUMIN SERPL BCG-MCNC: 2.1 G/DL (ref 3.4–4.8)
ALP SERPL-CCNC: 157 U/L (ref 40–110)
ALT SERPL W P-5'-P-CCNC: 15 U/L (ref 8–55)
ANALYZER IN CARDIO: (no result)
ANION GAP SERPL CALC-SCNC: 13 MMOL/L (ref 10–20)
AST SERPL-CCNC: 22 U/L (ref 5–34)
BASE EXCESS STD BLDV CALC-SCNC: 0.6 MEQ/L
BASOPHILS # BLD AUTO: 0 10X3/UL (ref 0–0.2)
BASOPHILS NFR BLD AUTO: 0.2 % (ref 0–2)
BILIRUB SERPL-MCNC: 0.2 MG/DL (ref 0.2–1.2)
BUN SERPL-MCNC: 25 MG/DL (ref 9.8–20.1)
CA-I BLDV-MCNC: 1.03 MMOL/L (ref 1.16–1.32)
CALCIUM SERPL-MCNC: 7.2 MG/DL (ref 7.8–10.44)
CHLORIDE BLDV-SCNC: 105 MMOL/L (ref 98–106)
CHLORIDE SERPL-SCNC: 109 MMOL/L (ref 98–107)
CO2 SERPL-SCNC: 25 MMOL/L (ref 23–31)
CREAT CL PREDICTED SERPL C-G-VRATE: 0 ML/MIN (ref 70–130)
EOSINOPHIL # BLD AUTO: 0.1 10X3/UL (ref 0–0.5)
EOSINOPHIL NFR BLD AUTO: 0.7 % (ref 0–6)
GLOBULIN SER CALC-MCNC: 2.2 G/DL (ref 2.4–3.5)
GLUCOSE SERPL-MCNC: 111 MG/DL (ref 80–115)
HCO3 BLDV-SCNC: 27.7 MEQ/L (ref 22–28)
HCT VFR BLD CALC: 37.4 % (ref 34.9–44.5)
HCT VFR BLDV CALC: 35 % (ref 36–47)
HGB BLD-MCNC: 12 G/DL (ref 12–15.5)
HGB BLDV-MCNC: 11.8 G/DL (ref 11.7–16.1)
LYMPHOCYTES NFR BLD AUTO: 12.9 % (ref 18–47)
MCH RBC QN AUTO: 31.1 PG (ref 27–33)
MCV RBC AUTO: 96.9 FL (ref 81.6–98.3)
MONOCYTES # BLD AUTO: 0.9 10X3/UL (ref 0–1.1)
MONOCYTES NFR BLD AUTO: 6.6 % (ref 0–10)
NEUTROPHILS # BLD AUTO: 10.7 10X3/UL (ref 1.5–8.4)
NEUTROPHILS NFR BLD AUTO: 79.4 % (ref 40–75)
PLATELET # BLD AUTO: 281 10X3/UL (ref 150–450)
POTASSIUM BLDV-SCNC: 3.48 MMOL/L (ref 3.7–5.3)
POTASSIUM SERPL-SCNC: 3.4 MMOL/L (ref 3.5–5.1)
RBC # BLD AUTO: 3.86 10X6/UL (ref 3.9–5.03)
SODIUM BLDV-SCNC: 146 MMOL/L (ref 133–146)
SODIUM SERPL-SCNC: 144 MMOL/L (ref 136–145)
SPECIMEN DRAWN FROM PATIENT: (no result)
TROPONIN I SERPL DL<=0.01 NG/ML-MCNC: 0.01 NG/ML (ref ?–0.03)
WBC # BLD AUTO: 13.4 10X3/UL (ref 3.5–10.5)

## 2023-11-12 PROCEDURE — 71045 X-RAY EXAM CHEST 1 VIEW: CPT

## 2023-11-12 PROCEDURE — 80053 COMPREHEN METABOLIC PANEL: CPT

## 2023-11-12 PROCEDURE — P9047 ALBUMIN (HUMAN), 25%, 50ML: HCPCS

## 2023-11-12 PROCEDURE — 82570 ASSAY OF URINE CREATININE: CPT

## 2023-11-12 PROCEDURE — 83735 ASSAY OF MAGNESIUM: CPT

## 2023-11-12 PROCEDURE — 36415 COLL VENOUS BLD VENIPUNCTURE: CPT

## 2023-11-12 PROCEDURE — 85025 COMPLETE CBC W/AUTO DIFF WBC: CPT

## 2023-11-12 PROCEDURE — 94762 N-INVAS EAR/PLS OXIMTRY CONT: CPT

## 2023-11-12 PROCEDURE — 82805 BLOOD GASES W/O2 SATURATION: CPT

## 2023-11-12 PROCEDURE — 81001 URINALYSIS AUTO W/SCOPE: CPT

## 2023-11-12 PROCEDURE — 96374 THER/PROPH/DIAG INJ IV PUSH: CPT

## 2023-11-12 PROCEDURE — 96376 TX/PRO/DX INJ SAME DRUG ADON: CPT

## 2023-11-12 PROCEDURE — 85027 COMPLETE CBC AUTOMATED: CPT

## 2023-11-12 PROCEDURE — 94640 AIRWAY INHALATION TREATMENT: CPT

## 2023-11-12 PROCEDURE — 84156 ASSAY OF PROTEIN URINE: CPT

## 2023-11-12 PROCEDURE — 93306 TTE W/DOPPLER COMPLETE: CPT

## 2023-11-12 PROCEDURE — 93005 ELECTROCARDIOGRAM TRACING: CPT

## 2023-11-12 PROCEDURE — 97139 UNLISTED THERAPEUTIC PX: CPT

## 2023-11-12 PROCEDURE — G0378 HOSPITAL OBSERVATION PER HR: HCPCS

## 2023-11-12 PROCEDURE — 36416 COLLJ CAPILLARY BLOOD SPEC: CPT

## 2023-11-12 PROCEDURE — 4A043R1 MEASUREMENT OF VENOUS SATURATION, PERIPHERAL, PERCUTANEOUS APPROACH: ICD-10-PCS | Performed by: INTERNAL MEDICINE

## 2023-11-12 PROCEDURE — 84484 ASSAY OF TROPONIN QUANT: CPT

## 2023-11-12 PROCEDURE — 83880 ASSAY OF NATRIURETIC PEPTIDE: CPT

## 2023-11-12 PROCEDURE — 94760 N-INVAS EAR/PLS OXIMETRY 1: CPT

## 2023-11-12 PROCEDURE — 96375 TX/PRO/DX INJ NEW DRUG ADDON: CPT

## 2023-11-12 PROCEDURE — 80202 ASSAY OF VANCOMYCIN: CPT

## 2023-11-12 PROCEDURE — 80048 BASIC METABOLIC PNL TOTAL CA: CPT

## 2023-11-12 RX ADMIN — ALBUMIN HUMAN SCH GM: 250 SOLUTION INTRAVENOUS at 20:13

## 2023-11-12 RX ADMIN — INSULIN LISPRO PRN UNIT: 100 INJECTION, SOLUTION INTRAVENOUS; SUBCUTANEOUS at 22:36

## 2023-11-12 RX ADMIN — CEFTRIAXONE SCH MLS: 1 INJECTION, POWDER, FOR SOLUTION INTRAMUSCULAR; INTRAVENOUS at 17:51

## 2023-11-13 LAB
ANION GAP SERPL CALC-SCNC: 15 MMOL/L (ref 10–20)
BUN SERPL-MCNC: 25 MG/DL (ref 9.8–20.1)
CALCIUM SERPL-MCNC: 7.7 MG/DL (ref 7.8–10.44)
CHLORIDE SERPL-SCNC: 109 MMOL/L (ref 98–107)
CO2 SERPL-SCNC: 18 MMOL/L (ref 23–31)
CREAT CL PREDICTED SERPL C-G-VRATE: 31 ML/MIN (ref 70–130)
GLUCOSE SERPL-MCNC: 164 MG/DL (ref 80–115)
POTASSIUM SERPL-SCNC: 3.3 MMOL/L (ref 3.5–5.1)
SODIUM SERPL-SCNC: 139 MMOL/L (ref 136–145)

## 2023-11-13 RX ADMIN — POTASSIUM CHLORIDE SCH MLS: 14.9 INJECTION, SOLUTION INTRAVENOUS at 17:08

## 2023-11-13 RX ADMIN — ALBUMIN HUMAN SCH GM: 250 SOLUTION INTRAVENOUS at 02:44

## 2023-11-13 RX ADMIN — INSULIN LISPRO PRN UNIT: 100 INJECTION, SOLUTION INTRAVENOUS; SUBCUTANEOUS at 12:50

## 2023-11-13 RX ADMIN — INSULIN LISPRO PRN UNIT: 100 INJECTION, SOLUTION INTRAVENOUS; SUBCUTANEOUS at 17:10

## 2023-11-13 RX ADMIN — POTASSIUM CHLORIDE SCH MLS: 14.9 INJECTION, SOLUTION INTRAVENOUS at 23:39

## 2023-11-13 RX ADMIN — ALBUMIN HUMAN SCH: 250 SOLUTION INTRAVENOUS at 02:35

## 2023-11-13 RX ADMIN — ALBUMIN HUMAN SCH GM: 250 SOLUTION INTRAVENOUS at 09:52

## 2023-11-13 RX ADMIN — ALBUMIN HUMAN SCH GM: 250 SOLUTION INTRAVENOUS at 15:40

## 2023-11-13 RX ADMIN — INSULIN LISPRO PRN UNIT: 100 INJECTION, SOLUTION INTRAVENOUS; SUBCUTANEOUS at 23:26

## 2023-11-13 RX ADMIN — CEFTRIAXONE SCH MLS: 1 INJECTION, POWDER, FOR SOLUTION INTRAMUSCULAR; INTRAVENOUS at 14:18

## 2023-11-14 LAB
ANION GAP SERPL CALC-SCNC: 14 MMOL/L (ref 10–20)
BACTERIA UR QL AUTO: (no result) HPF
BUN SERPL-MCNC: 23 MG/DL (ref 9.8–20.1)
CALCIUM SERPL-MCNC: 8.9 MG/DL (ref 7.8–10.44)
CAUTI INDICATIONS FOR CULTURE: (no result)
CHLORIDE SERPL-SCNC: 107 MMOL/L (ref 98–107)
CO2 SERPL-SCNC: 24 MMOL/L (ref 23–31)
CREAT CL PREDICTED SERPL C-G-VRATE: 32 ML/MIN (ref 70–130)
GLUCOSE SERPL-MCNC: 197 MG/DL (ref 80–115)
GLUCOSE UR STRIP-MCNC: 100 MG/DL
POTASSIUM SERPL-SCNC: 3.1 MMOL/L (ref 3.5–5.1)
PROT UR STRIP.AUTO-MCNC: 500 MG/DL
SODIUM SERPL-SCNC: 142 MMOL/L (ref 136–145)
SP GR UR STRIP: 1.01 (ref 1–1.03)
WBC UR QL AUTO: (no result) HPF (ref 0–3)

## 2023-11-14 RX ADMIN — CEFTRIAXONE SCH MLS: 1 INJECTION, POWDER, FOR SOLUTION INTRAMUSCULAR; INTRAVENOUS at 14:32

## 2023-11-14 RX ADMIN — POTASSIUM CHLORIDE SCH MLS: 14.9 INJECTION, SOLUTION INTRAVENOUS at 11:02

## 2023-11-14 RX ADMIN — POTASSIUM CHLORIDE SCH MLS: 14.9 INJECTION, SOLUTION INTRAVENOUS at 13:32

## 2023-11-14 RX ADMIN — INSULIN LISPRO PRN UNIT: 100 INJECTION, SOLUTION INTRAVENOUS; SUBCUTANEOUS at 07:45

## 2023-11-14 RX ADMIN — INSULIN LISPRO PRN UNIT: 100 INJECTION, SOLUTION INTRAVENOUS; SUBCUTANEOUS at 12:53

## 2023-11-14 RX ADMIN — NICARDIPINE HYDROCHLORIDE SCH MLS: 25 INJECTION INTRAVENOUS at 14:40

## 2023-11-14 RX ADMIN — INSULIN LISPRO PRN UNIT: 100 INJECTION, SOLUTION INTRAVENOUS; SUBCUTANEOUS at 21:54

## 2023-11-15 LAB
ANION GAP SERPL CALC-SCNC: 13 MMOL/L (ref 10–20)
BUN SERPL-MCNC: 22 MG/DL (ref 9.8–20.1)
CALCIUM SERPL-MCNC: 8.6 MG/DL (ref 7.8–10.44)
CHLORIDE SERPL-SCNC: 108 MMOL/L (ref 98–107)
CO2 SERPL-SCNC: 25 MMOL/L (ref 23–31)
CREAT CL PREDICTED SERPL C-G-VRATE: 34 ML/MIN (ref 70–130)
GLUCOSE SERPL-MCNC: 174 MG/DL (ref 80–115)
POTASSIUM SERPL-SCNC: 3.2 MMOL/L (ref 3.5–5.1)
SODIUM SERPL-SCNC: 143 MMOL/L (ref 136–145)
VANCOMYCIN SERPL-MCNC: 22.2 UG/ML

## 2023-11-15 RX ADMIN — INSULIN LISPRO PRN UNIT: 100 INJECTION, SOLUTION INTRAVENOUS; SUBCUTANEOUS at 22:53

## 2023-11-15 RX ADMIN — INSULIN LISPRO PRN UNIT: 100 INJECTION, SOLUTION INTRAVENOUS; SUBCUTANEOUS at 16:37

## 2023-11-15 RX ADMIN — NICARDIPINE HYDROCHLORIDE SCH MLS: 25 INJECTION INTRAVENOUS at 05:47

## 2023-11-15 RX ADMIN — CEFTRIAXONE SCH MLS: 1 INJECTION, POWDER, FOR SOLUTION INTRAMUSCULAR; INTRAVENOUS at 14:37

## 2023-11-15 RX ADMIN — INSULIN LISPRO PRN UNIT: 100 INJECTION, SOLUTION INTRAVENOUS; SUBCUTANEOUS at 06:18

## 2023-11-15 RX ADMIN — INSULIN LISPRO PRN UNIT: 100 INJECTION, SOLUTION INTRAVENOUS; SUBCUTANEOUS at 11:22

## 2023-11-15 RX ADMIN — INSULIN LISPRO PRN UNIT: 100 INJECTION, SOLUTION INTRAVENOUS; SUBCUTANEOUS at 07:58

## 2023-11-15 RX ADMIN — NICARDIPINE HYDROCHLORIDE SCH MLS: 25 INJECTION INTRAVENOUS at 14:37

## 2023-11-16 LAB
ANION GAP SERPL CALC-SCNC: 16 MMOL/L (ref 10–20)
BUN SERPL-MCNC: 31 MG/DL (ref 9.8–20.1)
CALCIUM SERPL-MCNC: 8.2 MG/DL (ref 7.8–10.44)
CHLORIDE SERPL-SCNC: 111 MMOL/L (ref 98–107)
CO2 SERPL-SCNC: 20 MMOL/L (ref 23–31)
CREAT CL PREDICTED SERPL C-G-VRATE: 31 ML/MIN (ref 70–130)
GLUCOSE SERPL-MCNC: 271 MG/DL (ref 80–115)
HCT VFR BLD CALC: 38.2 % (ref 34.9–44.5)
HGB BLD-MCNC: 12.4 G/DL (ref 12–15.5)
MAGNESIUM SERPL-MCNC: 1.6 MG/DL (ref 1.6–2.6)
MCH RBC QN AUTO: 31.4 PG (ref 27–33)
MCV RBC AUTO: 96.7 FL (ref 81.6–98.3)
PLATELET # BLD AUTO: 250 10X3/UL (ref 150–450)
POTASSIUM SERPL-SCNC: 4.4 MMOL/L (ref 3.5–5.1)
RBC # BLD AUTO: 3.95 10X6/UL (ref 3.9–5.03)
SODIUM SERPL-SCNC: 143 MMOL/L (ref 136–145)
VANCOMYCIN SERPL-MCNC: 14.7 UG/ML
WBC # BLD AUTO: 10.5 10X3/UL (ref 3.5–10.5)

## 2023-11-16 RX ADMIN — INSULIN LISPRO PRN UNIT: 100 INJECTION, SOLUTION INTRAVENOUS; SUBCUTANEOUS at 11:03

## 2023-11-16 RX ADMIN — INSULIN LISPRO PRN UNIT: 100 INJECTION, SOLUTION INTRAVENOUS; SUBCUTANEOUS at 05:00

## 2023-11-16 RX ADMIN — NICARDIPINE HYDROCHLORIDE SCH MLS: 25 INJECTION INTRAVENOUS at 03:33

## 2023-11-16 RX ADMIN — Medication SCH UNITS: at 08:29

## 2023-11-16 RX ADMIN — INSULIN LISPRO PRN UNIT: 100 INJECTION, SOLUTION INTRAVENOUS; SUBCUTANEOUS at 16:25

## 2023-11-16 RX ADMIN — NIFEDIPINE SCH MG: 30 TABLET, FILM COATED, EXTENDED RELEASE ORAL at 09:24

## 2023-11-16 RX ADMIN — LACTOBACILLUS ACIDOPHILUS / LACTOBACILLUS BULGARICUS SCH GM: 100 MILLION CFU STRENGTH GRANULES at 08:30

## 2023-11-16 RX ADMIN — CEFTRIAXONE SCH MLS: 1 INJECTION, POWDER, FOR SOLUTION INTRAMUSCULAR; INTRAVENOUS at 14:02

## 2023-11-16 RX ADMIN — INSULIN LISPRO PRN UNIT: 100 INJECTION, SOLUTION INTRAVENOUS; SUBCUTANEOUS at 20:41

## 2023-11-17 LAB
ANION GAP SERPL CALC-SCNC: 13 MMOL/L (ref 10–20)
BUN SERPL-MCNC: 33 MG/DL (ref 9.8–20.1)
CALCIUM SERPL-MCNC: 8 MG/DL (ref 7.8–10.44)
CHLORIDE SERPL-SCNC: 108 MMOL/L (ref 98–107)
CO2 SERPL-SCNC: 21 MMOL/L (ref 23–31)
CREAT CL PREDICTED SERPL C-G-VRATE: 31 ML/MIN (ref 70–130)
GLUCOSE SERPL-MCNC: 264 MG/DL (ref 80–115)
POTASSIUM SERPL-SCNC: 3.4 MMOL/L (ref 3.5–5.1)
SODIUM SERPL-SCNC: 139 MMOL/L (ref 136–145)
VANCOMYCIN SERPL-MCNC: 20.3 UG/ML

## 2023-11-17 RX ADMIN — INSULIN LISPRO PRN UNIT: 100 INJECTION, SOLUTION INTRAVENOUS; SUBCUTANEOUS at 16:38

## 2023-11-17 RX ADMIN — INSULIN GLARGINE SCH UNIT: 100 INJECTION, SOLUTION SUBCUTANEOUS at 22:26

## 2023-11-17 RX ADMIN — Medication SCH UNITS: at 08:13

## 2023-11-17 RX ADMIN — NIFEDIPINE SCH MG: 30 TABLET, FILM COATED, EXTENDED RELEASE ORAL at 08:13

## 2023-11-17 RX ADMIN — INSULIN LISPRO PRN UNIT: 100 INJECTION, SOLUTION INTRAVENOUS; SUBCUTANEOUS at 06:14

## 2023-11-17 RX ADMIN — INSULIN LISPRO PRN UNIT: 100 INJECTION, SOLUTION INTRAVENOUS; SUBCUTANEOUS at 11:32

## 2023-11-17 RX ADMIN — NICARDIPINE HYDROCHLORIDE SCH MLS: 25 INJECTION INTRAVENOUS at 12:34

## 2023-11-17 RX ADMIN — NICARDIPINE HYDROCHLORIDE SCH MLS: 25 INJECTION INTRAVENOUS at 07:33

## 2023-11-17 RX ADMIN — LACTOBACILLUS ACIDOPHILUS / LACTOBACILLUS BULGARICUS SCH GM: 100 MILLION CFU STRENGTH GRANULES at 08:14

## 2023-11-17 RX ADMIN — CEFTRIAXONE SCH MLS: 1 INJECTION, POWDER, FOR SOLUTION INTRAMUSCULAR; INTRAVENOUS at 14:39

## 2023-11-17 RX ADMIN — INSULIN LISPRO PRN UNIT: 100 INJECTION, SOLUTION INTRAVENOUS; SUBCUTANEOUS at 22:28

## 2023-11-18 LAB
ANION GAP SERPL CALC-SCNC: 16 MMOL/L (ref 10–20)
BASOPHILS # BLD AUTO: 0 10X3/UL (ref 0–0.2)
BASOPHILS NFR BLD AUTO: 0.3 % (ref 0–2)
BUN SERPL-MCNC: 41 MG/DL (ref 9.8–20.1)
CALCIUM SERPL-MCNC: 8.8 MG/DL (ref 7.8–10.44)
CHLORIDE SERPL-SCNC: 107 MMOL/L (ref 98–107)
CO2 SERPL-SCNC: 19 MMOL/L (ref 23–31)
CREAT CL PREDICTED SERPL C-G-VRATE: 25 ML/MIN (ref 70–130)
EOSINOPHIL # BLD AUTO: 0.1 10X3/UL (ref 0–0.5)
EOSINOPHIL NFR BLD AUTO: 0.7 % (ref 0–6)
GLUCOSE SERPL-MCNC: 400 MG/DL (ref 80–115)
HCT VFR BLD CALC: 36.4 % (ref 34.9–44.5)
HGB BLD-MCNC: 12 G/DL (ref 12–15.5)
LYMPHOCYTES NFR BLD AUTO: 9.5 % (ref 18–47)
MCH RBC QN AUTO: 31.2 PG (ref 27–33)
MCV RBC AUTO: 94.5 FL (ref 81.6–98.3)
MONOCYTES # BLD AUTO: 0.1 10X3/UL (ref 0–1.1)
MONOCYTES NFR BLD AUTO: 1.2 % (ref 0–10)
NEUTROPHILS # BLD AUTO: 9.7 10X3/UL (ref 1.5–8.4)
NEUTROPHILS NFR BLD AUTO: 87.2 % (ref 40–75)
PLATELET # BLD AUTO: 326 10X3/UL (ref 150–450)
POTASSIUM SERPL-SCNC: 3.8 MMOL/L (ref 3.5–5.1)
RBC # BLD AUTO: 3.85 10X6/UL (ref 3.9–5.03)
SODIUM SERPL-SCNC: 138 MMOL/L (ref 136–145)
VANCOMYCIN SERPL-MCNC: 17.1 UG/ML
WBC # BLD AUTO: 11.1 10X3/UL (ref 3.5–10.5)

## 2023-11-18 RX ADMIN — INSULIN LISPRO PRN UNIT: 100 INJECTION, SOLUTION INTRAVENOUS; SUBCUTANEOUS at 20:39

## 2023-11-18 RX ADMIN — INSULIN LISPRO PRN UNIT: 100 INJECTION, SOLUTION INTRAVENOUS; SUBCUTANEOUS at 06:44

## 2023-11-18 RX ADMIN — LACTOBACILLUS ACIDOPHILUS / LACTOBACILLUS BULGARICUS SCH GM: 100 MILLION CFU STRENGTH GRANULES at 08:55

## 2023-11-18 RX ADMIN — INSULIN LISPRO PRN UNIT: 100 INJECTION, SOLUTION INTRAVENOUS; SUBCUTANEOUS at 13:29

## 2023-11-18 RX ADMIN — INSULIN GLARGINE SCH UNIT: 100 INJECTION, SOLUTION SUBCUTANEOUS at 20:39

## 2023-11-18 RX ADMIN — INSULIN LISPRO PRN UNIT: 100 INJECTION, SOLUTION INTRAVENOUS; SUBCUTANEOUS at 16:31

## 2023-11-18 RX ADMIN — CEFTRIAXONE SCH MLS: 1 INJECTION, POWDER, FOR SOLUTION INTRAMUSCULAR; INTRAVENOUS at 14:17

## 2023-11-18 RX ADMIN — Medication SCH UNITS: at 08:45

## 2023-11-18 RX ADMIN — NIFEDIPINE SCH MG: 60 TABLET, EXTENDED RELEASE ORAL at 08:46

## 2023-11-19 VITALS — SYSTOLIC BLOOD PRESSURE: 145 MMHG | TEMPERATURE: 98.3 F | DIASTOLIC BLOOD PRESSURE: 73 MMHG

## 2023-11-19 LAB — VANCOMYCIN SERPL-MCNC: 21.3 UG/ML

## 2023-11-19 RX ADMIN — Medication SCH UNITS: at 08:30

## 2023-11-19 RX ADMIN — LACTOBACILLUS ACIDOPHILUS / LACTOBACILLUS BULGARICUS SCH: 100 MILLION CFU STRENGTH GRANULES at 11:53

## 2023-11-19 RX ADMIN — INSULIN LISPRO PRN UNIT: 100 INJECTION, SOLUTION INTRAVENOUS; SUBCUTANEOUS at 12:52

## 2023-11-19 RX ADMIN — CEFTRIAXONE SCH MLS: 1 INJECTION, POWDER, FOR SOLUTION INTRAMUSCULAR; INTRAVENOUS at 12:51

## 2023-11-19 RX ADMIN — NIFEDIPINE SCH MG: 60 TABLET, EXTENDED RELEASE ORAL at 08:30

## 2023-11-19 RX ADMIN — INSULIN LISPRO PRN UNIT: 100 INJECTION, SOLUTION INTRAVENOUS; SUBCUTANEOUS at 06:11
